# Patient Record
Sex: FEMALE | Race: WHITE | Employment: OTHER | RURAL
[De-identification: names, ages, dates, MRNs, and addresses within clinical notes are randomized per-mention and may not be internally consistent; named-entity substitution may affect disease eponyms.]

---

## 2017-03-15 ENCOUNTER — OFFICE VISIT (OUTPATIENT)
Dept: FAMILY MEDICINE CLINIC | Age: 62
End: 2017-03-15
Payer: MEDICARE

## 2017-03-15 ENCOUNTER — TELEPHONE (OUTPATIENT)
Dept: FAMILY MEDICINE CLINIC | Age: 62
End: 2017-03-15

## 2017-03-15 VITALS
HEART RATE: 81 BPM | WEIGHT: 223 LBS | OXYGEN SATURATION: 96 % | HEIGHT: 65 IN | TEMPERATURE: 98.8 F | SYSTOLIC BLOOD PRESSURE: 138 MMHG | BODY MASS INDEX: 37.15 KG/M2 | DIASTOLIC BLOOD PRESSURE: 80 MMHG

## 2017-03-15 DIAGNOSIS — J01.40 ACUTE NON-RECURRENT PANSINUSITIS: ICD-10-CM

## 2017-03-15 DIAGNOSIS — J06.9 ACUTE URI: Primary | ICD-10-CM

## 2017-03-15 PROCEDURE — 3017F COLORECTAL CA SCREEN DOC REV: CPT | Performed by: NURSE PRACTITIONER

## 2017-03-15 PROCEDURE — G8427 DOCREV CUR MEDS BY ELIG CLIN: HCPCS | Performed by: NURSE PRACTITIONER

## 2017-03-15 PROCEDURE — G8484 FLU IMMUNIZE NO ADMIN: HCPCS | Performed by: NURSE PRACTITIONER

## 2017-03-15 PROCEDURE — 3014F SCREEN MAMMO DOC REV: CPT | Performed by: NURSE PRACTITIONER

## 2017-03-15 PROCEDURE — 96372 THER/PROPH/DIAG INJ SC/IM: CPT | Performed by: NURSE PRACTITIONER

## 2017-03-15 PROCEDURE — 99213 OFFICE O/P EST LOW 20 MIN: CPT | Performed by: NURSE PRACTITIONER

## 2017-03-15 PROCEDURE — G8419 CALC BMI OUT NRM PARAM NOF/U: HCPCS | Performed by: NURSE PRACTITIONER

## 2017-03-15 PROCEDURE — 1036F TOBACCO NON-USER: CPT | Performed by: NURSE PRACTITIONER

## 2017-03-15 RX ORDER — CEFPROZIL 500 MG/1
500 TABLET, FILM COATED ORAL 2 TIMES DAILY
Qty: 20 TABLET | Refills: 0 | Status: SHIPPED | OUTPATIENT
Start: 2017-03-15 | End: 2017-03-25

## 2017-03-15 RX ORDER — CEFTRIAXONE 1 G/1
1 INJECTION, POWDER, FOR SOLUTION INTRAMUSCULAR; INTRAVENOUS ONCE
Status: COMPLETED | OUTPATIENT
Start: 2017-03-15 | End: 2017-03-15

## 2017-03-15 RX ORDER — BENZONATATE 100 MG/1
200 CAPSULE ORAL 3 TIMES DAILY PRN
Qty: 30 CAPSULE | Refills: 1 | Status: SHIPPED | OUTPATIENT
Start: 2017-03-15 | End: 2017-03-22

## 2017-03-15 RX ORDER — CLONIDINE HYDROCHLORIDE 0.2 MG/1
0.2 TABLET ORAL DAILY
COMMUNITY
End: 2017-05-24 | Stop reason: SDUPTHER

## 2017-03-15 RX ORDER — LEVOFLOXACIN 500 MG/1
500 TABLET, FILM COATED ORAL DAILY
Qty: 10 TABLET | Refills: 0 | Status: SHIPPED | OUTPATIENT
Start: 2017-03-15 | End: 2017-03-25

## 2017-03-15 RX ORDER — TRIAMCINOLONE ACETONIDE 40 MG/ML
80 INJECTION, SUSPENSION INTRA-ARTICULAR; INTRAMUSCULAR ONCE
Status: COMPLETED | OUTPATIENT
Start: 2017-03-15 | End: 2017-03-15

## 2017-03-15 RX ADMIN — CEFTRIAXONE 1 G: 1 INJECTION, POWDER, FOR SOLUTION INTRAMUSCULAR; INTRAVENOUS at 11:34

## 2017-03-15 RX ADMIN — TRIAMCINOLONE ACETONIDE 80 MG: 40 INJECTION, SUSPENSION INTRA-ARTICULAR; INTRAMUSCULAR at 11:33

## 2017-05-24 ENCOUNTER — OFFICE VISIT (OUTPATIENT)
Dept: FAMILY MEDICINE CLINIC | Age: 62
End: 2017-05-24
Payer: MEDICARE

## 2017-05-24 VITALS
HEIGHT: 65 IN | WEIGHT: 219 LBS | DIASTOLIC BLOOD PRESSURE: 72 MMHG | SYSTOLIC BLOOD PRESSURE: 138 MMHG | BODY MASS INDEX: 36.49 KG/M2

## 2017-05-24 DIAGNOSIS — E78.00 ELEVATED CHOLESTEROL: ICD-10-CM

## 2017-05-24 DIAGNOSIS — E55.9 VITAMIN D DEFICIENCY: ICD-10-CM

## 2017-05-24 DIAGNOSIS — M15.9 GENERALIZED OSTEOARTHROSIS, INVOLVING MULTIPLE SITES: Primary | ICD-10-CM

## 2017-05-24 DIAGNOSIS — I10 ESSENTIAL HYPERTENSION: ICD-10-CM

## 2017-05-24 LAB
ALBUMIN SERPL-MCNC: 4.4 G/DL (ref 3.5–5.2)
ALP BLD-CCNC: 117 U/L (ref 35–104)
ALT SERPL-CCNC: 5 U/L (ref 5–33)
ANION GAP SERPL CALCULATED.3IONS-SCNC: 16 MMOL/L (ref 7–19)
AST SERPL-CCNC: 12 U/L (ref 5–32)
BILIRUB SERPL-MCNC: 0.4 MG/DL (ref 0.2–1.2)
BUN BLDV-MCNC: 12 MG/DL (ref 8–23)
CALCIUM SERPL-MCNC: 9.6 MG/DL (ref 8.8–10.2)
CHLORIDE BLD-SCNC: 104 MMOL/L (ref 98–111)
CHOLESTEROL, TOTAL: 206 MG/DL (ref 160–199)
CO2: 22 MMOL/L (ref 22–29)
CREAT SERPL-MCNC: 0.7 MG/DL (ref 0.5–0.9)
GFR NON-AFRICAN AMERICAN: >60
GLUCOSE BLD-MCNC: 88 MG/DL (ref 74–109)
HCT VFR BLD CALC: 44.3 % (ref 37–47)
HDLC SERPL-MCNC: 53 MG/DL (ref 65–121)
HEMOGLOBIN: 14.2 G/DL (ref 12–16)
LDL CHOLESTEROL CALCULATED: 138 MG/DL
MCH RBC QN AUTO: 30.9 PG (ref 27–31)
MCHC RBC AUTO-ENTMCNC: 32.1 G/DL (ref 33–37)
MCV RBC AUTO: 96.3 FL (ref 81–99)
PDW BLD-RTO: 14.7 % (ref 11.5–14.5)
PLATELET # BLD: 302 K/UL (ref 130–400)
PMV BLD AUTO: 11.3 FL (ref 7.4–10.4)
POTASSIUM SERPL-SCNC: 4.4 MMOL/L (ref 3.5–5)
RBC # BLD: 4.6 M/UL (ref 4.2–5.4)
SODIUM BLD-SCNC: 142 MMOL/L (ref 136–145)
TOTAL PROTEIN: 6.6 G/DL (ref 6.6–8.7)
TRIGL SERPL-MCNC: 75 MG/DL (ref 150–199)
VITAMIN D 25-HYDROXY: 18.7 NG/ML
WBC # BLD: 10.1 K/UL (ref 4.8–10.8)

## 2017-05-24 PROCEDURE — G8427 DOCREV CUR MEDS BY ELIG CLIN: HCPCS | Performed by: NURSE PRACTITIONER

## 2017-05-24 PROCEDURE — G8419 CALC BMI OUT NRM PARAM NOF/U: HCPCS | Performed by: NURSE PRACTITIONER

## 2017-05-24 PROCEDURE — 1036F TOBACCO NON-USER: CPT | Performed by: NURSE PRACTITIONER

## 2017-05-24 PROCEDURE — 36415 COLL VENOUS BLD VENIPUNCTURE: CPT | Performed by: NURSE PRACTITIONER

## 2017-05-24 PROCEDURE — 99214 OFFICE O/P EST MOD 30 MIN: CPT | Performed by: NURSE PRACTITIONER

## 2017-05-24 PROCEDURE — 3017F COLORECTAL CA SCREEN DOC REV: CPT | Performed by: NURSE PRACTITIONER

## 2017-05-24 PROCEDURE — 3014F SCREEN MAMMO DOC REV: CPT | Performed by: NURSE PRACTITIONER

## 2017-05-24 RX ORDER — DILTIAZEM HYDROCHLORIDE 240 MG/1
240 CAPSULE, COATED, EXTENDED RELEASE ORAL DAILY
Qty: 30 CAPSULE | Refills: 5 | Status: ON HOLD | OUTPATIENT
Start: 2017-05-24 | End: 2020-09-28 | Stop reason: HOSPADM

## 2017-05-24 RX ORDER — CLONIDINE HYDROCHLORIDE 0.2 MG/1
0.2 TABLET ORAL DAILY
Qty: 60 TABLET | Refills: 5 | Status: ON HOLD | OUTPATIENT
Start: 2017-05-24 | End: 2020-09-28 | Stop reason: HOSPADM

## 2017-05-24 RX ORDER — OMEPRAZOLE 40 MG/1
CAPSULE, DELAYED RELEASE ORAL
Qty: 30 CAPSULE | Refills: 5 | Status: SHIPPED | OUTPATIENT
Start: 2017-05-24

## 2017-05-24 RX ORDER — LOSARTAN POTASSIUM 100 MG/1
100 TABLET ORAL DAILY
Qty: 30 TABLET | Refills: 5 | Status: SHIPPED | OUTPATIENT
Start: 2017-05-24 | End: 2020-09-25

## 2017-05-24 RX ORDER — FUROSEMIDE 20 MG/1
20 TABLET ORAL DAILY
Qty: 30 TABLET | Refills: 5 | Status: SHIPPED | OUTPATIENT
Start: 2017-05-24 | End: 2021-04-20

## 2017-05-24 ASSESSMENT — ENCOUNTER SYMPTOMS
EYES NEGATIVE: 1
RESPIRATORY NEGATIVE: 1
SPUTUM PRODUCTION: 0
ORTHOPNEA: 0
SHORTNESS OF BREATH: 0
COUGH: 0
BACK PAIN: 0
HEARTBURN: 1
HEMOPTYSIS: 0

## 2020-09-25 ENCOUNTER — APPOINTMENT (OUTPATIENT)
Dept: GENERAL RADIOLOGY | Age: 65
DRG: 247 | End: 2020-09-25
Payer: MEDICARE

## 2020-09-25 ENCOUNTER — HOSPITAL ENCOUNTER (INPATIENT)
Age: 65
LOS: 1 days | Discharge: HOME | DRG: 247 | End: 2020-09-28
Attending: EMERGENCY MEDICINE | Admitting: HOSPITALIST
Payer: MEDICARE

## 2020-09-25 ENCOUNTER — APPOINTMENT (OUTPATIENT)
Dept: CT IMAGING | Age: 65
DRG: 247 | End: 2020-09-25
Payer: MEDICARE

## 2020-09-25 PROBLEM — I25.9 CHEST PAIN DUE TO MYOCARDIAL ISCHEMIA: Status: ACTIVE | Noted: 2020-09-25

## 2020-09-25 LAB
ALBUMIN SERPL-MCNC: 4.1 G/DL (ref 3.5–5.2)
ALP BLD-CCNC: 89 U/L (ref 35–104)
ALT SERPL-CCNC: <5 U/L (ref 5–33)
ANION GAP SERPL CALCULATED.3IONS-SCNC: 11 MMOL/L (ref 7–19)
APTT: 32 SEC (ref 26–36.2)
AST SERPL-CCNC: 16 U/L (ref 5–32)
BASOPHILS ABSOLUTE: 0.1 K/UL (ref 0–0.2)
BASOPHILS RELATIVE PERCENT: 0.5 % (ref 0–1)
BILIRUB SERPL-MCNC: <0.2 MG/DL (ref 0.2–1.2)
BILIRUBIN URINE: NEGATIVE
BLOOD, URINE: NEGATIVE
BUN BLDV-MCNC: 11 MG/DL (ref 8–23)
CALCIUM SERPL-MCNC: 9.2 MG/DL (ref 8.8–10.2)
CHLORIDE BLD-SCNC: 105 MMOL/L (ref 98–111)
CHOLESTEROL, TOTAL: 188 MG/DL (ref 160–199)
CLARITY: CLEAR
CO2: 25 MMOL/L (ref 22–29)
COLOR: YELLOW
CREAT SERPL-MCNC: 0.9 MG/DL (ref 0.5–0.9)
D DIMER: 0.35 UG/ML FEU (ref 0–0.48)
EOSINOPHILS ABSOLUTE: 0.1 K/UL (ref 0–0.6)
EOSINOPHILS RELATIVE PERCENT: 0.8 % (ref 0–5)
GFR AFRICAN AMERICAN: >59
GFR NON-AFRICAN AMERICAN: >60
GLUCOSE BLD-MCNC: 118 MG/DL (ref 74–109)
GLUCOSE URINE: NEGATIVE MG/DL
HBA1C MFR BLD: 5.1 % (ref 4–6)
HCT VFR BLD CALC: 44.7 % (ref 37–47)
HDLC SERPL-MCNC: 42 MG/DL (ref 65–121)
HEMOGLOBIN: 14.9 G/DL (ref 12–16)
IMMATURE GRANULOCYTES #: 0 K/UL
INR BLD: 1.13 (ref 0.88–1.18)
KETONES, URINE: NEGATIVE MG/DL
LDL CHOLESTEROL CALCULATED: 106 MG/DL
LEUKOCYTE ESTERASE, URINE: NEGATIVE
LYMPHOCYTES ABSOLUTE: 3.2 K/UL (ref 1.1–4.5)
LYMPHOCYTES RELATIVE PERCENT: 31.7 % (ref 20–40)
MAGNESIUM: 2.1 MG/DL (ref 1.6–2.4)
MCH RBC QN AUTO: 31.1 PG (ref 27–31)
MCHC RBC AUTO-ENTMCNC: 33.3 G/DL (ref 33–37)
MCV RBC AUTO: 93.3 FL (ref 81–99)
MONOCYTES ABSOLUTE: 0.9 K/UL (ref 0–0.9)
MONOCYTES RELATIVE PERCENT: 8.7 % (ref 0–10)
NEUTROPHILS ABSOLUTE: 5.9 K/UL (ref 1.5–7.5)
NEUTROPHILS RELATIVE PERCENT: 58 % (ref 50–65)
NITRITE, URINE: NEGATIVE
PDW BLD-RTO: 14.6 % (ref 11.5–14.5)
PH UA: 5.5 (ref 5–8)
PHOSPHORUS: 3.7 MG/DL (ref 2.5–4.5)
PLATELET # BLD: 293 K/UL (ref 130–400)
PMV BLD AUTO: 10.6 FL (ref 9.4–12.3)
POTASSIUM REFLEX MAGNESIUM: 3.6 MMOL/L (ref 3.5–5)
PRO-BNP: 1509 PG/ML (ref 0–900)
PROTEIN UA: NEGATIVE MG/DL
PROTHROMBIN TIME: 14.5 SEC (ref 12–14.6)
RBC # BLD: 4.79 M/UL (ref 4.2–5.4)
SODIUM BLD-SCNC: 141 MMOL/L (ref 136–145)
SPECIFIC GRAVITY UA: 1.01 (ref 1–1.03)
T4 FREE: 1.56 NG/DL (ref 0.93–1.7)
TOTAL PROTEIN: 6.4 G/DL (ref 6.6–8.7)
TRIGL SERPL-MCNC: 199 MG/DL (ref 0–149)
TROPONIN: <0.01 NG/ML (ref 0–0.03)
TSH REFLEX FT4: 0.78 UIU/ML (ref 0.35–5.5)
UROBILINOGEN, URINE: 1 E.U./DL
WBC # BLD: 10.2 K/UL (ref 4.8–10.8)

## 2020-09-25 PROCEDURE — 36415 COLL VENOUS BLD VENIPUNCTURE: CPT

## 2020-09-25 PROCEDURE — 84439 ASSAY OF FREE THYROXINE: CPT

## 2020-09-25 PROCEDURE — 83735 ASSAY OF MAGNESIUM: CPT

## 2020-09-25 PROCEDURE — 83036 HEMOGLOBIN GLYCOSYLATED A1C: CPT

## 2020-09-25 PROCEDURE — 84100 ASSAY OF PHOSPHORUS: CPT

## 2020-09-25 PROCEDURE — 85025 COMPLETE CBC W/AUTO DIFF WBC: CPT

## 2020-09-25 PROCEDURE — 84443 ASSAY THYROID STIM HORMONE: CPT

## 2020-09-25 PROCEDURE — 99284 EMERGENCY DEPT VISIT MOD MDM: CPT

## 2020-09-25 PROCEDURE — 85610 PROTHROMBIN TIME: CPT

## 2020-09-25 PROCEDURE — G0378 HOSPITAL OBSERVATION PER HR: HCPCS

## 2020-09-25 PROCEDURE — 81003 URINALYSIS AUTO W/O SCOPE: CPT

## 2020-09-25 PROCEDURE — 85379 FIBRIN DEGRADATION QUANT: CPT

## 2020-09-25 PROCEDURE — 6370000000 HC RX 637 (ALT 250 FOR IP): Performed by: HOSPITALIST

## 2020-09-25 PROCEDURE — 80061 LIPID PANEL: CPT

## 2020-09-25 PROCEDURE — 71275 CT ANGIOGRAPHY CHEST: CPT

## 2020-09-25 PROCEDURE — 99283 EMERGENCY DEPT VISIT LOW MDM: CPT

## 2020-09-25 PROCEDURE — 2580000003 HC RX 258: Performed by: HOSPITALIST

## 2020-09-25 PROCEDURE — 83880 ASSAY OF NATRIURETIC PEPTIDE: CPT

## 2020-09-25 PROCEDURE — 6360000004 HC RX CONTRAST MEDICATION: Performed by: EMERGENCY MEDICINE

## 2020-09-25 PROCEDURE — 84484 ASSAY OF TROPONIN QUANT: CPT

## 2020-09-25 PROCEDURE — 80053 COMPREHEN METABOLIC PANEL: CPT

## 2020-09-25 PROCEDURE — 71045 X-RAY EXAM CHEST 1 VIEW: CPT

## 2020-09-25 PROCEDURE — 99999 PR OFFICE/OUTPT VISIT,PROCEDURE ONLY: CPT | Performed by: EMERGENCY MEDICINE

## 2020-09-25 PROCEDURE — 85730 THROMBOPLASTIN TIME PARTIAL: CPT

## 2020-09-25 RX ORDER — ACETAMINOPHEN 325 MG/1
650 TABLET ORAL EVERY 6 HOURS PRN
Status: DISCONTINUED | OUTPATIENT
Start: 2020-09-25 | End: 2020-09-27 | Stop reason: SDUPTHER

## 2020-09-25 RX ORDER — OLMESARTAN MEDOXOMIL 20 MG/1
20 TABLET ORAL DAILY
Status: ON HOLD | COMMUNITY
End: 2020-09-28 | Stop reason: HOSPADM

## 2020-09-25 RX ORDER — ALBUTEROL SULFATE 2.5 MG/3ML
2.5 SOLUTION RESPIRATORY (INHALATION) EVERY 4 HOURS PRN
Status: DISCONTINUED | OUTPATIENT
Start: 2020-09-25 | End: 2020-09-28 | Stop reason: HOSPADM

## 2020-09-25 RX ORDER — SODIUM CHLORIDE 0.9 % (FLUSH) 0.9 %
10 SYRINGE (ML) INJECTION PRN
Status: DISCONTINUED | OUTPATIENT
Start: 2020-09-25 | End: 2020-09-27 | Stop reason: SDUPTHER

## 2020-09-25 RX ORDER — PANTOPRAZOLE SODIUM 40 MG/1
40 TABLET, DELAYED RELEASE ORAL
Status: DISCONTINUED | OUTPATIENT
Start: 2020-09-26 | End: 2020-09-28 | Stop reason: HOSPADM

## 2020-09-25 RX ORDER — ASPIRIN 81 MG/1
81 TABLET, CHEWABLE ORAL DAILY
Status: DISCONTINUED | OUTPATIENT
Start: 2020-09-26 | End: 2020-09-28 | Stop reason: HOSPADM

## 2020-09-25 RX ORDER — ATORVASTATIN CALCIUM 40 MG/1
40 TABLET, FILM COATED ORAL NIGHTLY
Status: DISCONTINUED | OUTPATIENT
Start: 2020-09-25 | End: 2020-09-28 | Stop reason: HOSPADM

## 2020-09-25 RX ORDER — MAGNESIUM SULFATE IN WATER 40 MG/ML
2 INJECTION, SOLUTION INTRAVENOUS PRN
Status: DISCONTINUED | OUTPATIENT
Start: 2020-09-25 | End: 2020-09-28 | Stop reason: HOSPADM

## 2020-09-25 RX ORDER — CLONIDINE HYDROCHLORIDE 0.1 MG/1
0.2 TABLET ORAL NIGHTLY
Status: DISCONTINUED | OUTPATIENT
Start: 2020-09-25 | End: 2020-09-26

## 2020-09-25 RX ORDER — POTASSIUM CHLORIDE 7.45 MG/ML
10 INJECTION INTRAVENOUS PRN
Status: DISCONTINUED | OUTPATIENT
Start: 2020-09-25 | End: 2020-09-28 | Stop reason: HOSPADM

## 2020-09-25 RX ORDER — ACETAMINOPHEN 650 MG/1
650 SUPPOSITORY RECTAL EVERY 6 HOURS PRN
Status: DISCONTINUED | OUTPATIENT
Start: 2020-09-25 | End: 2020-09-28 | Stop reason: HOSPADM

## 2020-09-25 RX ORDER — LANOLIN ALCOHOL/MO/W.PET/CERES
3 CREAM (GRAM) TOPICAL NIGHTLY PRN
Status: DISCONTINUED | OUTPATIENT
Start: 2020-09-25 | End: 2020-09-28 | Stop reason: HOSPADM

## 2020-09-25 RX ORDER — ONDANSETRON 4 MG/1
4 TABLET, ORALLY DISINTEGRATING ORAL EVERY 8 HOURS PRN
Status: DISCONTINUED | OUTPATIENT
Start: 2020-09-25 | End: 2020-09-28 | Stop reason: HOSPADM

## 2020-09-25 RX ORDER — ONDANSETRON 2 MG/ML
4 INJECTION INTRAMUSCULAR; INTRAVENOUS EVERY 6 HOURS PRN
Status: DISCONTINUED | OUTPATIENT
Start: 2020-09-25 | End: 2020-09-27 | Stop reason: SDUPTHER

## 2020-09-25 RX ORDER — ACETAMINOPHEN 500 MG
1000 TABLET ORAL EVERY 6 HOURS PRN
Status: ON HOLD | COMMUNITY
End: 2020-09-28 | Stop reason: HOSPADM

## 2020-09-25 RX ORDER — POLYETHYLENE GLYCOL 3350 17 G/17G
17 POWDER, FOR SOLUTION ORAL DAILY PRN
Status: DISCONTINUED | OUTPATIENT
Start: 2020-09-25 | End: 2020-09-28 | Stop reason: HOSPADM

## 2020-09-25 RX ORDER — POTASSIUM CHLORIDE 20 MEQ/1
40 TABLET, EXTENDED RELEASE ORAL PRN
Status: DISCONTINUED | OUTPATIENT
Start: 2020-09-25 | End: 2020-09-28 | Stop reason: HOSPADM

## 2020-09-25 RX ORDER — SODIUM CHLORIDE 0.9 % (FLUSH) 0.9 %
10 SYRINGE (ML) INJECTION EVERY 12 HOURS SCHEDULED
Status: DISCONTINUED | OUTPATIENT
Start: 2020-09-25 | End: 2020-09-27 | Stop reason: SDUPTHER

## 2020-09-25 RX ORDER — FUROSEMIDE 40 MG/1
20 TABLET ORAL DAILY
Status: DISCONTINUED | OUTPATIENT
Start: 2020-09-25 | End: 2020-09-28 | Stop reason: HOSPADM

## 2020-09-25 RX ORDER — HYDROCORTISONE ACETATE 25 MG/1
25 SUPPOSITORY RECTAL 2 TIMES DAILY PRN
Status: DISCONTINUED | OUTPATIENT
Start: 2020-09-25 | End: 2020-09-28 | Stop reason: HOSPADM

## 2020-09-25 RX ORDER — DILTIAZEM HYDROCHLORIDE 240 MG/1
240 CAPSULE, COATED, EXTENDED RELEASE ORAL DAILY
Status: DISCONTINUED | OUTPATIENT
Start: 2020-09-26 | End: 2020-09-26

## 2020-09-25 RX ADMIN — IOPAMIDOL 90 ML: 755 INJECTION, SOLUTION INTRAVENOUS at 20:01

## 2020-09-25 RX ADMIN — NITROGLYCERIN 1 INCH: 20 OINTMENT TOPICAL at 23:00

## 2020-09-25 RX ADMIN — CLONIDINE HYDROCHLORIDE 0.2 MG: 0.1 TABLET ORAL at 22:54

## 2020-09-25 RX ADMIN — MELATONIN 3 MG: at 22:54

## 2020-09-25 RX ADMIN — SODIUM CHLORIDE, PRESERVATIVE FREE 10 ML: 5 INJECTION INTRAVENOUS at 22:55

## 2020-09-25 ASSESSMENT — ENCOUNTER SYMPTOMS
VOICE CHANGE: 0
VOMITING: 0
ABDOMINAL PAIN: 0
COUGH: 0
EYE PAIN: 0
SHORTNESS OF BREATH: 1
DIARRHEA: 0
RHINORRHEA: 0
EYE REDNESS: 0

## 2020-09-25 ASSESSMENT — PAIN SCALES - GENERAL
PAINLEVEL_OUTOF10: 0
PAINLEVEL_OUTOF10: 0

## 2020-09-25 NOTE — ED NOTES
ASSESSMENT:  Pt co increasing SOB over the past month, worse with exertion. SKIN:  Warm, dry, pink. Cap refill < 2 sec  CARDIAC:  S1 S2 noted  LUNGS: clear upper and lower lobes. Respirations even and unlabored. ABDOMEN: bowel sounds noted upper and lower quadrants. Soft and tender. EXTREMITIES: bilateral DP and PT. No edema noted. Pt alert and oriented x4. Pupils equal and reactive. No distress noted. Side rails up and call light within reach.        Nikki Foster RN  09/25/20 6250

## 2020-09-25 NOTE — ED NOTES
Pt aware urine sample is needed, but unable to void at this time.       Pretty Lora RN  09/25/20 [id number]

## 2020-09-25 NOTE — ED NOTES
Patient placed on cardiac monitor, continuous pulse oximeter, and NIBP monitor. Monitor alarms on. Patient placed in a gown  Mask and gloves worn by staff while in pt room. Pt masked during all contact with staff.        Dixie Villar RN  09/25/20 [id number]

## 2020-09-26 ENCOUNTER — APPOINTMENT (OUTPATIENT)
Dept: ULTRASOUND IMAGING | Age: 65
DRG: 247 | End: 2020-09-26
Payer: MEDICARE

## 2020-09-26 PROBLEM — E66.9 OBESITY (BMI 30-39.9): Status: ACTIVE | Noted: 2020-09-26

## 2020-09-26 PROBLEM — E27.8 ADRENAL NODULE (HCC): Status: ACTIVE | Noted: 2020-09-26

## 2020-09-26 PROBLEM — E27.9 ADRENAL NODULE (HCC): Status: ACTIVE | Noted: 2020-09-26

## 2020-09-26 LAB
ANION GAP SERPL CALCULATED.3IONS-SCNC: 15 MMOL/L (ref 7–19)
BUN BLDV-MCNC: 13 MG/DL (ref 8–23)
CALCIUM SERPL-MCNC: 8.8 MG/DL (ref 8.8–10.2)
CHLORIDE BLD-SCNC: 106 MMOL/L (ref 98–111)
CO2: 22 MMOL/L (ref 22–29)
CREAT SERPL-MCNC: 0.7 MG/DL (ref 0.5–0.9)
GFR AFRICAN AMERICAN: >59
GFR NON-AFRICAN AMERICAN: >60
GLUCOSE BLD-MCNC: 120 MG/DL (ref 74–109)
HCT VFR BLD CALC: 39 % (ref 37–47)
HEMOGLOBIN: 12.8 G/DL (ref 12–16)
LV EF: 52 %
LVEF MODALITY: NORMAL
MCH RBC QN AUTO: 31.3 PG (ref 27–31)
MCHC RBC AUTO-ENTMCNC: 32.8 G/DL (ref 33–37)
MCV RBC AUTO: 95.4 FL (ref 81–99)
PDW BLD-RTO: 14.5 % (ref 11.5–14.5)
PLATELET # BLD: 265 K/UL (ref 130–400)
PMV BLD AUTO: 11.1 FL (ref 9.4–12.3)
POTASSIUM REFLEX MAGNESIUM: 3.6 MMOL/L (ref 3.5–5)
RBC # BLD: 4.09 M/UL (ref 4.2–5.4)
SODIUM BLD-SCNC: 143 MMOL/L (ref 136–145)
TROPONIN: <0.01 NG/ML (ref 0–0.03)
WBC # BLD: 11.5 K/UL (ref 4.8–10.8)

## 2020-09-26 PROCEDURE — 6370000000 HC RX 637 (ALT 250 FOR IP): Performed by: INTERNAL MEDICINE

## 2020-09-26 PROCEDURE — G0378 HOSPITAL OBSERVATION PER HR: HCPCS

## 2020-09-26 PROCEDURE — 93306 TTE W/DOPPLER COMPLETE: CPT

## 2020-09-26 PROCEDURE — 93005 ELECTROCARDIOGRAM TRACING: CPT | Performed by: HOSPITALIST

## 2020-09-26 PROCEDURE — 6370000000 HC RX 637 (ALT 250 FOR IP): Performed by: HOSPITALIST

## 2020-09-26 PROCEDURE — 84484 ASSAY OF TROPONIN QUANT: CPT

## 2020-09-26 PROCEDURE — 96372 THER/PROPH/DIAG INJ SC/IM: CPT

## 2020-09-26 PROCEDURE — 85027 COMPLETE CBC AUTOMATED: CPT

## 2020-09-26 PROCEDURE — 6360000002 HC RX W HCPCS: Performed by: HOSPITALIST

## 2020-09-26 PROCEDURE — 80048 BASIC METABOLIC PNL TOTAL CA: CPT

## 2020-09-26 PROCEDURE — 99222 1ST HOSP IP/OBS MODERATE 55: CPT | Performed by: INTERNAL MEDICINE

## 2020-09-26 PROCEDURE — 36415 COLL VENOUS BLD VENIPUNCTURE: CPT

## 2020-09-26 RX ORDER — AMLODIPINE BESYLATE 5 MG/1
5 TABLET ORAL 2 TIMES DAILY
Status: DISCONTINUED | OUTPATIENT
Start: 2020-09-26 | End: 2020-09-28 | Stop reason: HOSPADM

## 2020-09-26 RX ORDER — CARVEDILOL 12.5 MG/1
12.5 TABLET ORAL 2 TIMES DAILY WITH MEALS
Status: DISCONTINUED | OUTPATIENT
Start: 2020-09-26 | End: 2020-09-28 | Stop reason: HOSPADM

## 2020-09-26 RX ORDER — ISOSORBIDE MONONITRATE 30 MG/1
30 TABLET, EXTENDED RELEASE ORAL DAILY
Status: DISCONTINUED | OUTPATIENT
Start: 2020-09-26 | End: 2020-09-28 | Stop reason: HOSPADM

## 2020-09-26 RX ORDER — LOSARTAN POTASSIUM 100 MG/1
100 TABLET ORAL DAILY
Status: DISCONTINUED | OUTPATIENT
Start: 2020-09-26 | End: 2020-09-28 | Stop reason: HOSPADM

## 2020-09-26 RX ADMIN — DILTIAZEM HYDROCHLORIDE 240 MG: 240 CAPSULE, COATED, EXTENDED RELEASE ORAL at 08:17

## 2020-09-26 RX ADMIN — NITROGLYCERIN 1 INCH: 20 OINTMENT TOPICAL at 19:57

## 2020-09-26 RX ADMIN — LOSARTAN POTASSIUM 100 MG: 100 TABLET, FILM COATED ORAL at 10:56

## 2020-09-26 RX ADMIN — MELATONIN 3 MG: at 20:01

## 2020-09-26 RX ADMIN — AMLODIPINE BESYLATE 5 MG: 5 TABLET ORAL at 15:44

## 2020-09-26 RX ADMIN — NITROGLYCERIN 1 INCH: 20 OINTMENT TOPICAL at 05:25

## 2020-09-26 RX ADMIN — PANTOPRAZOLE SODIUM 40 MG: 40 TABLET, DELAYED RELEASE ORAL at 05:26

## 2020-09-26 RX ADMIN — ISOSORBIDE MONONITRATE 30 MG: 30 TABLET, EXTENDED RELEASE ORAL at 15:44

## 2020-09-26 RX ADMIN — ENOXAPARIN SODIUM 40 MG: 40 INJECTION SUBCUTANEOUS at 10:56

## 2020-09-26 RX ADMIN — CARVEDILOL 12.5 MG: 12.5 TABLET, FILM COATED ORAL at 17:48

## 2020-09-26 RX ADMIN — AMLODIPINE BESYLATE 5 MG: 5 TABLET ORAL at 19:57

## 2020-09-26 RX ADMIN — NITROGLYCERIN 1 INCH: 20 OINTMENT TOPICAL at 13:30

## 2020-09-26 RX ADMIN — ASPIRIN 81 MG: 81 TABLET, CHEWABLE ORAL at 08:16

## 2020-09-26 RX ADMIN — FUROSEMIDE 20 MG: 40 TABLET ORAL at 08:16

## 2020-09-26 ASSESSMENT — ENCOUNTER SYMPTOMS
EYE DISCHARGE: 0
DIARRHEA: 0
ABDOMINAL DISTENTION: 0
CONSTIPATION: 0
BACK PAIN: 0
CHEST TIGHTNESS: 1
WHEEZING: 0
SHORTNESS OF BREATH: 1
VOMITING: 0
BLOOD IN STOOL: 0
COUGH: 0

## 2020-09-26 ASSESSMENT — PAIN SCALES - GENERAL
PAINLEVEL_OUTOF10: 0

## 2020-09-26 NOTE — H&P
test.    (following subjective sections as per chart review, other than allergies)    Past Medical History:   Diagnosis Date    DJD (degenerative joint disease)     knees    Hypertension      Past Surgical History:   Procedure Laterality Date    CARPAL TUNNEL RELEASE      right arm     SECTION      CORONARY ANGIOPLASTY   and     HAND TENDON SURGERY      right    HYSTERECTOMY       Social History     Tobacco History     Smoking Status  Smoker Quit date   Smoking Frequency  1 pack/day Smoking Tobacco Type  Cigarettes    Smokeless Tobacco Use  Never Used          Alcohol History     Alcohol Use Status  No          Drug Use     Drug Use Status  No           Family History   Problem Relation Age of Onset    Heart Failure Mother          heart attack    Stroke Mother     Hypertension Mother     Cancer Other         Uncles    Hypertension Other         paternal side as well     Allergies   Allergen Reactions    Ace Inhibitors      \"I really don't remember\" - [id number]    Augmentin [Amoxicillin-Pot Clavulanate] Nausea Only     Current Facility-Administered Medications   Medication Dose Route Frequency Provider Last Rate Last Dose    albuterol (PROVENTIL) nebulizer solution 2.5 mg  2.5 mg Nebulization Q4H PRN Edwige Yost MD        hydrocortisone (ANUSOL-HC) suppository 25 mg  25 mg Rectal BID PRN Edwige Yost MD        furosemide (LASIX) tablet 20 mg  20 mg Oral Daily Edwige Yost MD        pantoprazole (PROTONIX) tablet 40 mg  40 mg Oral QAM AC Edwige Yost MD   40 mg at 20 0526    dilTIAZem (CARDIZEM CD) extended release capsule 240 mg  240 mg Oral Daily Edwige Yost MD        cloNIDine (CATAPRES) tablet 0.2 mg  0.2 mg Oral Nightly Edwige Yost MD   0.2 mg at 20 225    sodium chloride flush 0.9 % injection 10 mL  10 mL Intravenous 2 times per day Edwige Yost MD   10 mL at 20 2255    sodium chloride flush 0.9 % injection 10 mL  10 mL Intravenous PRN Michelet Hickey MD        acetaminophen (TYLENOL) tablet 650 mg  650 mg Oral Q6H PRN Michelet Hickey MD        Or    acetaminophen (TYLENOL) suppository 650 mg  650 mg Rectal Q6H PRN Michelet Hickey MD        atorvastatin (LIPITOR) tablet 40 mg  40 mg Oral Nightly Michelet Hickey MD        aspirin chewable tablet 81 mg  81 mg Oral Daily Michelet Hickey MD        perflutren lipid microspheres (DEFINITY) injection 1.65 mg  1.5 mL Intravenous ONCE PRN Michelet Hickey MD        potassium chloride (KLOR-CON M) extended release tablet 40 mEq  40 mEq Oral PRN Michelet Hickey MD        Or    potassium bicarb-citric acid (EFFER-K) effervescent tablet 40 mEq  40 mEq Oral PRN Michelet Hickey MD        Or    potassium chloride 10 mEq/100 mL IVPB (Peripheral Line)  10 mEq Intravenous PRN Michelet Hickey MD        magnesium sulfate 2 g in 50 mL IVPB premix  2 g Intravenous PRN Michelet Hickey MD        ondansetron (ZOFRAN-ODT) disintegrating tablet 4 mg  4 mg Oral Q8H PRN Michelet Hickey MD        Or    [facility]etron [facility]) injection 4 mg  4 mg Intravenous Q6H PRN Michelet Hickey MD        polyethylene glycol [facility]) packet 17 g  17 g Oral Daily PRN Michelet Hickey MD        melatonin tablet 3 mg  3 mg Oral Nightly PRN Michelet Hickey MD   3 mg at 09/25/20 2254    enoxaparin (LOVENOX) injection 40 mg  40 mg Subcutaneous Daily Michelet Hickey MD        nitroglycerin (NITRO-BID) 2 % ointment 1 inch  1 inch Topical 4 times per day Michelet Hickey MD   1 inch at 09/26/20 0525     Home Medications:  Prior to Admission medications    Medication Sig Start Date End Date Taking?  Authorizing Provider   olmesartan (BENICAR) 20 MG tablet Take 20 mg by mouth daily Pt is unsure of dose   Yes Historical Provider, MD   acetaminophen (TYLENOL) 500 MG tablet Take 1,000 mg by mouth every 6 hours as needed for Pain   Yes Historical Provider, MD   diltiazem (CARTIA XT) 240 MG extended release capsule Take 1 capsule by mouth daily 5/24/17 9/25/20 Yes RAFAT Gonzalez CNP   furosemide (LASIX) 20 MG tablet Take 1 tablet by mouth daily 5/24/17  Yes RAFAT Gonzalez CNP   cloNIDine (CATAPRES) 0.2 MG tablet Take 1 tablet by mouth daily  Patient taking differently: Take 0.2 mg by mouth nightly  5/24/17  Yes RAFAT Gonzalez CNP   omeprazole (PRILOSEC) 40 MG delayed release capsule TAKE ONE CAPSULE BY MOUTH ONCE DAILY 5/24/17  Yes RAFAT Gonzalez CNP         OBJECTIVE:    Vitals:    09/26/20 0221   BP: (!) 168/92   Pulse: 89   Resp: 18   Temp: 97.4 °F (36.3 °C)   SpO2: 97%   breathing room air    BP (!) 168/92   Pulse 89   Temp 97.4 °F (36.3 °C) (Temporal)   Resp 18   Ht 5' 5\" (1.651 m)   Wt 192 lb 9.6 oz (87.4 kg)   SpO2 97%   BMI 32.05 kg/m²       Intake/Output Summary (Last 24 hours) at 9/26/2020 0733  Last data filed at 9/26/2020 0706  Gross per 24 hour   Intake 1500 ml   Output 1475 ml   Net 25 ml     Physical Exam  Vitals signs reviewed. Constitutional:       General: She is not in acute distress. Appearance: Normal appearance. She is obese. She is not ill-appearing or toxic-appearing. HENT:      Head: Normocephalic and atraumatic. Nose: No congestion or rhinorrhea. Eyes:      General:         Right eye: No discharge. Left eye: No discharge. Neck:      Musculoskeletal: Neck supple. Comments: Trachea appears midline  Cardiovascular:      Rate and Rhythm: Normal rate and regular rhythm. Heart sounds: No murmur. No friction rub. No gallop. Pulmonary:      Effort: Pulmonary effort is normal. No respiratory distress. Breath sounds: No stridor. No wheezing, rhonchi or rales. Chest:      Chest wall: No tenderness. Abdominal:      General: Bowel sounds are normal.      Tenderness: There is no abdominal tenderness. There is no guarding or rebound. Musculoskeletal:         General: No tenderness. Right lower leg: No edema.       Left lower leg: No edema.   Skin:     General: Skin is warm. Comments: nondiaphoretic   Neurological:      Mental Status: She is alert and oriented to person, place, and time. Psychiatric:         Mood and Affect: Mood normal.         Behavior: Behavior normal.       LABORATORY DATA:    CBC:   Recent Labs     09/25/20 1738 09/26/20  0101   WBC 10.2 11.5*   HGB 14.9 12.8   HCT 44.7 39.0    265     BMP:   Recent Labs     09/25/20 1738 09/26/20  0101    143   K 3.6 3.6    106   CO2 25 22   BUN 11 13   CREATININE 0.9 0.7   CALCIUM 9.2 8.8   PHOS 3.7  --      Hepatic Profile:   Recent Labs     09/25/20 1738   AST 16   ALT <5*   BILITOT <0.2   ALKPHOS 89     Coag Panel:   Recent Labs     09/25/20 1738   INR 1.13   PROTIME 14.5   APTT 32.0     Cardiac Enzymes:   Recent Labs     09/26/20  0101 09/26/20  0403 09/26/20  0635   TROPONINI <0.01 <0.01 <0.01     Pro-BNP:   Recent Labs     09/25/20 1738   PROBNP 1,509*     A1C:   Recent Labs     09/25/20 1738   LABA1C 5.1     TSH: Invalid input(s): LABTSH    ABG: No results for input(s): PHART, NII9QFN, PO2ART, SLI9WLL, BEART, HGBAE, N3SRFHGG, CARBOXHGBART in the last 72 hours. Urinalysis:   Lab Results   Component Value Date    NITRU Negative 09/25/2020    BLOODU Negative 09/25/2020    SPECGRAV 1.011 09/25/2020    GLUCOSEU Negative 09/25/2020       IMAGING:  Xr Chest Portable  Result Date: 9/25/2020  1. Probable right basilar atelectasis, otherwise no acute radiographic cardiopulmonary process. . Signed by Dr Jennyfer Bonner on 9/25/2020 6:03 PM  Cta Pulmonary W Contrast  Result Date: 9/25/2020  1. No pulmonary emboli. 2. Dense coronary artery calcifications. Mild cardiomegaly. Trace pericardial fluid or thickening. No thoracic aortic aneurysm or dissection. 3. Mild fluid distended esophagus may be due to gastroesophageal reflux.  Poor primary esophageal peristalsis or lower esophageal stenosis considered in the differential. This places patient at increased risk for aspiration. No aspiration pneumonia seen on the current exam. Basilar atelectasis. Emphysematous change. 4. 5.5 cm fatty-containing left adrenal nodule favorable for a myelolipoma.  Signed by Dr Keyon Frost on 9/25/2020 8:17 PM        ASESSMENTS & PLANS:    Chest Pain:  Tele  Admit to cardiac shah as OBSERVATION status patient  Cardio consult in AM  Trend TnI  Mag, Phos, TSH, Lipid Panel, HbA1c - will run as add ons to ED labs if able  CBC and BMP with Reflex for following AM  ASA as per protocol (324-325mg chewed STAT unless on 81ASA daily in which case 162mg chewed STAT if not yet given, THEN from following AM 81mg Daily.)  Statin as per protocol (High intensity Statin, if already on high intensity Stain of Simvasttain 80 continue it, if Lipitor 40+ then Lipitor 80 (if less than 40 just double so long as >=40), if Rosuvastatin 20mg+ then Rosuvastatin 40mg PO QHS (if less than 20 just double so long as >=20mg)  NG SL PRN CP  TTE in AM  Continue home cardiac meds  Need dose of Olmesartan to continue it  Added on D Dimer and FT4 and ProBNP      Chronic Medical Problems:  Continue current Regimen as indicated      Patient Active Problem List   Diagnosis    Essential hypertension    Generalized osteoarthrosis, involving multiple sites    Essential hypertension    Edema    Encounter for long-term (current) use of other medications    Bronchitis    Elevated cholesterol    Vitamin D deficiency    HTN (hypertension)    Osteoporosis    Chest pain due to myocardial ischemia      furosemide  20 mg Oral Daily    pantoprazole  40 mg Oral QAM AC    dilTIAZem  240 mg Oral Daily    cloNIDine  0.2 mg Oral Nightly    sodium chloride flush  10 mL Intravenous 2 times per day    atorvastatin  40 mg Oral Nightly    aspirin  81 mg Oral Daily    enoxaparin  40 mg Subcutaneous Daily    nitroglycerin  1 inch Topical 4 times per day       Supoportive and Prophylactic Txx:  DVT Prophylaxis: Lovenox SQ  GI (PUD) Ppx: not indicated as such  PT consult for evalutation and Txx as indicated: not indicated  DIET CARDIAC; No Caffeine  albuterol, hydrocortisone, sodium chloride flush, acetaminophen **OR** acetaminophen, perflutren lipid microspheres, potassium chloride **OR** potassium alternative oral replacement **OR** potassium chloride, magnesium sulfate, ondansetron **OR** ondansetron, polyethylene glycol, melatonin      Care time of >45 minutes  Pt seen/examined and admitted to Observation status. Inpatient status is used for patients with an expected LOS extending past two midnights due to medical therapy and or critical care needs, otherwise patients are placed to OBServation status. Signed:  Electronically signed by Eren Grigsby MD on 9/26/20 at 07:50 AM CDT.

## 2020-09-26 NOTE — ED NOTES
Pt's  phone number [phone], call him if you need him for anything     Galilea Mcclellan RN  09/25/20 7608

## 2020-09-26 NOTE — CONSULTS
[facility]  Cardiology Consult      Requesting MD:  Sundar Portillo MD   Admit Status:  Observation [104]       History obtained from:   ? Patient  ? Other (specify):     PROBLEM LIST:    Patient Active Problem List    Diagnosis Date Noted    Adrenal nodule ([address] 09/26/2020     Priority: Low    Obesity (BMI 30-39.9) 09/26/2020     Priority: Low    Chest pain due to myocardial ischemia 09/25/2020     Priority: Low    Osteoporosis 11/28/2016     Priority: Low    HTN (hypertension) 12/10/2013     Priority: Low    Vitamin D deficiency 06/20/2013     Priority: Low    Elevated cholesterol 12/21/2012     Priority: Low    Bronchitis 11/26/2012     Priority: Low    Essential hypertension 02/03/2011     Priority: Low    Edema 02/03/2011     Priority: Low    Encounter for long-term (current) use of other medications 02/03/2011     Priority: Low    Essential hypertension 01/03/2011     Priority: Low    Generalized osteoarthrosis, involving multiple sites 01/03/2011     Priority: Low     1. Coronary artery disease, prior catheterization 1/22/2014 with intermediate mid circumflex stenosis (FFR 0.84) medically managed, normal LV ejection fraction. 2.  Hypertension with urgency and evidence of large left adrenal mass 5.5 cm (questionable myolipoma). 3.  Active ongoing tobacco use. 4.  Unstable angina presentation with hypertensive urgency. PRESENTATION: Maya Davenport is a 63y.o. year old female who presents with episode of fluttering in her chest associate with chest tightness and palpitations which she describes as fast beating and noted to have elevated blood pressures at 622 systolic which prompted evaluation. Worse symptom lasted about 30 to 40 minutes. Troponins were negative. Around Labor Day while lying in bed sleeping she experienced left upper back pain which lasted about 2 hours.   This was associate with palpitations and shortness of breath as well as chest pressure when she tried to get up and walk around. She did not seek medical attention at that time. In the interim she has been doing fairly well. She still smokes 1 pack/day. CTA did show dense coronary calcification, distended esophagus, evidence of emphysema as well as a large left adrenal fat-containing mass with at 5.5 cm. BNP slightly elevated at 1500  EKG shows low voltage, sinus rhythm, poor R wave progression in right precordial leads with questionable septal infarct. No significant ST-T wave changes noted at rest.    REVIEW OF SYSTEMS:  Review of Systems   Constitutional: Negative for activity change, fatigue and fever. HENT: Negative for ear pain, hearing loss and tinnitus. Eyes: Negative for discharge and visual disturbance. Respiratory: Positive for chest tightness and shortness of breath. Negative for cough and wheezing. Cardiovascular: Positive for chest pain and palpitations. Negative for leg swelling. Gastrointestinal: Negative for abdominal distention, blood in stool, constipation, diarrhea and vomiting. Endocrine: Negative for cold intolerance, heat intolerance, polydipsia and polyuria. Genitourinary: Negative for dysuria and hematuria. Musculoskeletal: Negative for arthralgias, back pain and myalgias. Skin: Negative for pallor and rash. Neurological: Negative for seizures, syncope, weakness and headaches. Psychiatric/Behavioral: Negative for behavioral problems and dysphoric mood.        Past Medical History:      Diagnosis Date    DJD (degenerative joint disease)     knees    Hypertension        Past Surgical History:      Procedure Laterality Date    CARPAL TUNNEL RELEASE      right arm     SECTION      CORONARY ANGIOPLASTY   and     HAND TENDON SURGERY      right    HYSTERECTOMY         Allergies:  Ace inhibitors and Augmentin [amoxicillin-pot clavulanate]    Past Social History:  Social History     Socioeconomic History    Marital status:  Spouse name: Not on file    Number of children: Not on file    Years of education: Not on file    Highest education level: Not on file   Occupational History    Not on file   Social Needs    Financial resource strain: Not on file    Food insecurity     Worry: Not on file     Inability: Not on file    Transportation needs     Medical: Not on file     Non-medical: Not on file   Tobacco Use    Smoking status: Former Smoker     Packs/day: 1.00     Types: Cigarettes     Last attempt to quit: 2014     Years since quittin.7    Smokeless tobacco: Never Used   Substance and Sexual Activity    Alcohol use: No    Drug use: No    Sexual activity: Not on file   Lifestyle    Physical activity     Days per week: Not on file     Minutes per session: Not on file    Stress: Not on file   Relationships    Social connections     Talks on phone: Not on file     Gets together: Not on file     Attends [employer] service: Not on file     Active member of club or organization: Not on file     Attends meetings of clubs or organizations: Not on file     Relationship status: Not on file    Intimate partner violence     Fear of current or ex partner: Not on file     Emotionally abused: Not on file     Physically abused: Not on file     Forced sexual activity: Not on file   Other Topics Concern    Not on file   Social History Narrative    Not on file       Family History:       Problem Relation Age of Onset    Heart Failure Mother          heart attack    Stroke Mother     Hypertension Mother     Cancer Other         Uncles    Hypertension Other         paternal side as well       Home Meds:  Prior to Admission medications    Medication Sig Start Date End Date Taking?  Authorizing Provider   olmesartan (BENICAR) 20 MG tablet Take 20 mg by mouth daily Pt is unsure of dose   Yes Historical Provider, MD   acetaminophen (TYLENOL) 500 MG tablet Take 1,000 mg by mouth every 6 hours as needed for Pain   Yes Historical Provider, MD   diltiazem (CARTIA XT) 240 MG extended release capsule Take 1 capsule by mouth daily 5/24/17 9/25/20 Yes Violeta Husbands, APRN - CNP   furosemide (LASIX) 20 MG tablet Take 1 tablet by mouth daily 5/24/17  Yes Violeta Husbands, RAFAT - CNP   cloNIDine (CATAPRES) 0.2 MG tablet Take 1 tablet by mouth daily  Patient taking differently: Take 0.2 mg by mouth nightly  5/24/17  Yes Violeta Husbands, APRN - CNP   omeprazole (PRILOSEC) 40 MG delayed release capsule TAKE ONE CAPSULE BY MOUTH ONCE DAILY 5/24/17  Yes Violeta HusbandRAFAT melendrez CNP       Current Meds:   losartan  100 mg Oral Daily    carvedilol  12.5 mg Oral BID WC    amLODIPine  5 mg Oral BID    isosorbide mononitrate  30 mg Oral Daily    furosemide  20 mg Oral Daily    pantoprazole  40 mg Oral QAM AC    sodium chloride flush  10 mL Intravenous 2 times per day    atorvastatin  40 mg Oral Nightly    aspirin  81 mg Oral Daily    enoxaparin  40 mg Subcutaneous Daily    nitroglycerin  1 inch Topical 4 times per day       Current Infused Meds:      Physical Exam:  Vitals:    09/26/20 0757   BP: (!) 172/90   Pulse: 81   Resp: 16   Temp: 98.2 °F (36.8 °C)   SpO2: 97%       Intake/Output Summary (Last 24 hours) at 9/26/2020 1322  Last data filed at 9/26/2020 1249  Gross per 24 hour   Intake 1500 ml   Output 3775 ml   Net -2275 ml     Estimated body mass index is 32.32 kg/m² as calculated from the following:    Height as of this encounter: 5' 5\" (1.651 m). Weight as of this encounter: 194 lb 4 oz (88.1 kg). Physical Exam  Constitutional:       General: She is not in acute distress. Appearance: She is not diaphoretic. HENT:      Mouth/Throat:      Pharynx: No oropharyngeal exudate. Eyes:      General: No scleral icterus. Right eye: No discharge. Left eye: No discharge. Neck:      Thyroid: No thyromegaly. Vascular: No JVD.    Cardiovascular:      Rate and Rhythm: Normal rate and regular rhythm. No extrasystoles are present. Heart sounds: Normal heart sounds, S1 normal and S2 normal. No murmur. No systolic murmur. No diastolic murmur. No friction rub. No gallop. No S3 or S4 sounds. Comments: No JVD  No edema  No significant systolic or diastolic murmurs appreciated  No gallop noted    Pulmonary:      Effort: Pulmonary effort is normal. No respiratory distress. Breath sounds: Normal breath sounds. No wheezing or rales. Chest:      Chest wall: No tenderness. Abdominal:      General: Bowel sounds are normal. There is no distension. Palpations: Abdomen is soft. There is no mass. Tenderness: There is no abdominal tenderness. There is no guarding or rebound. Hernia: No hernia is present. Musculoskeletal: Normal range of motion. Skin:     General: Skin is warm. Coloration: Skin is not pale. Findings: No rash. Neurological:      Mental Status: She is alert and oriented to person, place, and time. Cranial Nerves: No cranial nerve deficit. Deep Tendon Reflexes: Reflexes normal.           Labs:  Recent Labs     09/25/20  1738 09/26/20  0101   WBC 10.2 11.5*   HGB 14.9 12.8    265       Recent Labs     09/25/20  1738 09/26/20  0101    143   K 3.6 3.6    106   CO2 25 22   BUN 11 13   CREATININE 0.9 0.7   LABGLOM >60 >60   MG 2.1  --    CALCIUM 9.2 8.8   PHOS 3.7  --        CK, CKMB, Troponin: @LABRCNT (CKTOTAL:3, CKMB:3, TROPONINI:3)@    Last 3 BNP:          IMAGING:  Xr Chest Portable    Result Date: 9/25/2020  XR CHEST PORTABLE 9/25/2020 4:36 PM HISTORY: Shortness of air COMPARISON: None. CXR: A single frontal view of the chest is performed. Findings: Probable right basilar atelectasis. No dense consolidation or edema. Cardiac mediastinal contours normal. No pleural effusion or pneumothorax. No acute bony pathology. 1. Probable right basilar atelectasis, otherwise no acute radiographic cardiopulmonary process. . Signed by  PM          Assessment and Plan: This is a 63y.o. year old female with past medical history of coronary artery disease with known intermediate grade circumflex stenosis by catheterization 1/2014, hypertension, active ongoing tobacco use with recent onset of recurrent chest pain with shortness of breath and palpitations with hypertensive urgency. 1.  Given initial episode of left upper back pain which started her symptoms strong consideration for worsening and coronary artery disease especially in light of ongoing tobacco use is likely. I have discussed with the patient the absolute need for tobacco cessation. All preventative management will likely be futile going forward with ongoing tobacco use. Given her presentation and risk factors she would benefit from cardiac catheterization to rule out obstructive CAD as etiology for symptoms. The option of stress testing was also discussed with them. They would prefer to proceed with cardiac catheterization after full explanation of risks, benefits and alternatives. 2.  She does have hypertensive urgency. There is evidence for large left adrenal mass that appears fat-containing by radiology read. I have requested a plasma free metanephrine level. We will need to rule out pheochromocytoma. A renal artery duplex is also been requested. Would discontinue clonidine at nighttime as this increases the risk of rebound swings and hypertension. Discontinue Cardizem. Start Coreg 12.5 mg twice daily and Norvasc 5 mg twice daily. Add Imdur 30 mg once daily. Continue Lasix 20 mg daily. Risks, benefits, alternatives of cardiac catheterization/PCI discussed with the patient and full informed consent obtained.   Acceptable Mallampati score  Consent for moderate conscious sedation  ASA 3      Electronically signed by Caterina Chakraborty MD on 9/26/2020 at 1:22 PM

## 2020-09-26 NOTE — PROGRESS NOTES
Meka Medrano arrived to room # 710-2. Presented with: chest pain  Mental Status: Patient is oriented, alert and coherent. Vitals:    09/25/20 2213   BP: (!) 210/107   Pulse: 100   Resp: 18   Temp: 97.3 °F (36.3 °C)   SpO2: 98%     Patient safety contract and falls prevention contract reviewed with patient Yes. Oriented Patient to room. Call light within reach. Yes.   Needs, issues or concerns expressed at this time: no.      Electronically signed by Rayo Ayoub RN on 9/25/2020 at 10:47 PM

## 2020-09-26 NOTE — PROGRESS NOTES
BUN 11 13   CREATININE 0.9 0.7   GLUCOSE 118* 120*   CALCIUM 9.2 8.8     Recent Labs     09/25/20  1738   MG 2.1   PHOS 3.7     Recent Labs     09/25/20  1738   AST 16   ALT <5*   BILITOT <0.2   ALKPHOS 89     ABGs:No results for input(s): PH, PO2, PCO2, HCO3, BE, O2SAT in the last 72 hours. Troponin T:   Recent Labs     09/26/20  0101 09/26/20  0403 09/26/20  0635   TROPONINI <0.01 <0.01 <0.01     INR:   Recent Labs     09/25/20  1738   INR 1.13     Lactic Acid: No results for input(s): LACTA in the last 72 hours. Objective:   Vitals: BP (!) 172/90 Comment: reported to day shift rn  Pulse 81   Temp 98.2 °F (36.8 °C) (Temporal)   Resp 16   Ht 5' 5\" (1.651 m)   Wt 194 lb 4 oz (88.1 kg)   SpO2 97%   BMI 32.32 kg/m²   24HR INTAKE/OUTPUT:      Intake/Output Summary (Last 24 hours) at 9/26/2020 1106  Last data filed at 9/26/2020 0706  Gross per 24 hour   Intake 1500 ml   Output 1475 ml   Net 25 ml     General appearance: alert and cooperative with exam  HEENT: AT/NC  Lungs: no increased work of breathing, BLAE  Heart: RRR  Abdomen: Soft, BS+  Extremities: no edema  Neurologic: Alert, gross motor and sensory function intact  Skin: warm    Assessment and Plan:   Principal Problem:    Chest pain due to myocardial ischemia  Active Problems:    Essential hypertension    Elevated cholesterol    Vitamin D deficiency    Osteoporosis    Adrenal nodule (HCC)    Obesity (BMI 30-39. 9)  Resolved Problems:    * No resolved hospital problems. *    Chest pain/CHAVARRIA: Aspirin/Statin. TTE. Cardiology consult. Troponins unremarkable. Monitor on telemetry. HTN: Monitor BP and adjust medications PRN. Renal artery US performed. F/u plasma metanephrines. Tobacco abuse: Counseled regarding cessation.     Advance Directive: Full Code    DVT prophylaxis: Lovenox    Discharge planning: TBD      Signed:  Manda Guzmán MD 9/26/2020 11:06 AM  Rounding Hospitalist

## 2020-09-27 ENCOUNTER — APPOINTMENT (OUTPATIENT)
Dept: ULTRASOUND IMAGING | Age: 65
DRG: 247 | End: 2020-09-27
Payer: MEDICARE

## 2020-09-27 VITALS
HEIGHT: 65 IN | DIASTOLIC BLOOD PRESSURE: 95 MMHG | RESPIRATION RATE: 18 BRPM | WEIGHT: 191.38 LBS | TEMPERATURE: 98.4 F | OXYGEN SATURATION: 95 % | BODY MASS INDEX: 31.89 KG/M2 | HEART RATE: 76 BPM | SYSTOLIC BLOOD PRESSURE: 170 MMHG

## 2020-09-27 PROBLEM — R07.9 CHEST PAIN: Status: ACTIVE | Noted: 2020-09-27

## 2020-09-27 LAB
ANION GAP SERPL CALCULATED.3IONS-SCNC: 13 MMOL/L (ref 7–19)
BUN BLDV-MCNC: 18 MG/DL (ref 8–23)
CALCIUM SERPL-MCNC: 9.1 MG/DL (ref 8.8–10.2)
CHLORIDE BLD-SCNC: 106 MMOL/L (ref 98–111)
CO2: 23 MMOL/L (ref 22–29)
CREAT SERPL-MCNC: 0.7 MG/DL (ref 0.5–0.9)
GFR AFRICAN AMERICAN: >59
GFR NON-AFRICAN AMERICAN: >60
GLUCOSE BLD-MCNC: 100 MG/DL (ref 74–109)
HCT VFR BLD CALC: 37.3 % (ref 37–47)
HEMOGLOBIN: 12.6 G/DL (ref 12–16)
MCH RBC QN AUTO: 31.6 PG (ref 27–31)
MCHC RBC AUTO-ENTMCNC: 33.8 G/DL (ref 33–37)
MCV RBC AUTO: 93.5 FL (ref 81–99)
PDW BLD-RTO: 14.3 % (ref 11.5–14.5)
PLATELET # BLD: 260 K/UL (ref 130–400)
PMV BLD AUTO: 11.5 FL (ref 9.4–12.3)
POTASSIUM REFLEX MAGNESIUM: 3.8 MMOL/L (ref 3.5–5)
RBC # BLD: 3.99 M/UL (ref 4.2–5.4)
SODIUM BLD-SCNC: 142 MMOL/L (ref 136–145)
TROPONIN: <0.01 NG/ML (ref 0–0.03)
WBC # BLD: 9.5 K/UL (ref 4.8–10.8)

## 2020-09-27 PROCEDURE — 6370000000 HC RX 637 (ALT 250 FOR IP)

## 2020-09-27 PROCEDURE — 82088 ASSAY OF ALDOSTERONE: CPT

## 2020-09-27 PROCEDURE — 2500000003 HC RX 250 WO HCPCS

## 2020-09-27 PROCEDURE — 6370000000 HC RX 637 (ALT 250 FOR IP): Performed by: INTERNAL MEDICINE

## 2020-09-27 PROCEDURE — 36415 COLL VENOUS BLD VENIPUNCTURE: CPT

## 2020-09-27 PROCEDURE — 6360000002 HC RX W HCPCS: Performed by: HOSPITALIST

## 2020-09-27 PROCEDURE — 2140000000 HC CCU INTERMEDIATE R&B

## 2020-09-27 PROCEDURE — C1874 STENT, COATED/COV W/DEL SYS: HCPCS

## 2020-09-27 PROCEDURE — 6360000004 HC RX CONTRAST MEDICATION: Performed by: INTERNAL MEDICINE

## 2020-09-27 PROCEDURE — 99153 MOD SED SAME PHYS/QHP EA: CPT

## 2020-09-27 PROCEDURE — 2580000003 HC RX 258: Performed by: INTERNAL MEDICINE

## 2020-09-27 PROCEDURE — 2580000003 HC RX 258: Performed by: HOSPITALIST

## 2020-09-27 PROCEDURE — 84484 ASSAY OF TROPONIN QUANT: CPT

## 2020-09-27 PROCEDURE — 92928 PRQ TCAT PLMT NTRAC ST 1 LES: CPT

## 2020-09-27 PROCEDURE — 99152 MOD SED SAME PHYS/QHP 5/>YRS: CPT

## 2020-09-27 PROCEDURE — 92928 PRQ TCAT PLMT NTRAC ST 1 LES: CPT | Performed by: INTERNAL MEDICINE

## 2020-09-27 PROCEDURE — C1887 CATHETER, GUIDING: HCPCS

## 2020-09-27 PROCEDURE — 4A023N7 MEASUREMENT OF CARDIAC SAMPLING AND PRESSURE, LEFT HEART, PERCUTANEOUS APPROACH: ICD-10-PCS | Performed by: INTERNAL MEDICINE

## 2020-09-27 PROCEDURE — B2111ZZ FLUOROSCOPY OF MULTIPLE CORONARY ARTERIES USING LOW OSMOLAR CONTRAST: ICD-10-PCS | Performed by: INTERNAL MEDICINE

## 2020-09-27 PROCEDURE — C1894 INTRO/SHEATH, NON-LASER: HCPCS

## 2020-09-27 PROCEDURE — 6360000002 HC RX W HCPCS

## 2020-09-27 PROCEDURE — 93975 VASCULAR STUDY: CPT

## 2020-09-27 PROCEDURE — C1725 CATH, TRANSLUMIN NON-LASER: HCPCS

## 2020-09-27 PROCEDURE — 84244 ASSAY OF RENIN: CPT

## 2020-09-27 PROCEDURE — 85027 COMPLETE CBC AUTOMATED: CPT

## 2020-09-27 PROCEDURE — 80048 BASIC METABOLIC PNL TOTAL CA: CPT

## 2020-09-27 PROCEDURE — 93458 L HRT ARTERY/VENTRICLE ANGIO: CPT | Performed by: INTERNAL MEDICINE

## 2020-09-27 PROCEDURE — 2709999900 HC NON-CHARGEABLE SUPPLY

## 2020-09-27 PROCEDURE — 93458 L HRT ARTERY/VENTRICLE ANGIO: CPT

## 2020-09-27 PROCEDURE — 99152 MOD SED SAME PHYS/QHP 5/>YRS: CPT | Performed by: INTERNAL MEDICINE

## 2020-09-27 PROCEDURE — 027034Z DILATION OF CORONARY ARTERY, ONE ARTERY WITH DRUG-ELUTING INTRALUMINAL DEVICE, PERCUTANEOUS APPROACH: ICD-10-PCS | Performed by: INTERNAL MEDICINE

## 2020-09-27 PROCEDURE — C1769 GUIDE WIRE: HCPCS

## 2020-09-27 PROCEDURE — 6370000000 HC RX 637 (ALT 250 FOR IP): Performed by: HOSPITALIST

## 2020-09-27 PROCEDURE — B2151ZZ FLUOROSCOPY OF LEFT HEART USING LOW OSMOLAR CONTRAST: ICD-10-PCS | Performed by: INTERNAL MEDICINE

## 2020-09-27 RX ORDER — CLOPIDOGREL BISULFATE 75 MG/1
75 TABLET ORAL DAILY
Status: DISCONTINUED | OUTPATIENT
Start: 2020-09-28 | End: 2020-09-28 | Stop reason: HOSPADM

## 2020-09-27 RX ORDER — SODIUM CHLORIDE 0.9 % (FLUSH) 0.9 %
10 SYRINGE (ML) INJECTION PRN
Status: DISCONTINUED | OUTPATIENT
Start: 2020-09-27 | End: 2020-09-27 | Stop reason: SDUPTHER

## 2020-09-27 RX ORDER — NITROGLYCERIN 0.4 MG/1
0.4 TABLET SUBLINGUAL EVERY 5 MIN PRN
Status: DISCONTINUED | OUTPATIENT
Start: 2020-09-27 | End: 2020-09-28 | Stop reason: HOSPADM

## 2020-09-27 RX ORDER — ONDANSETRON 2 MG/ML
4 INJECTION INTRAMUSCULAR; INTRAVENOUS EVERY 6 HOURS PRN
Status: DISCONTINUED | OUTPATIENT
Start: 2020-09-27 | End: 2020-09-27 | Stop reason: SDUPTHER

## 2020-09-27 RX ORDER — SODIUM CHLORIDE 0.9 % (FLUSH) 0.9 %
10 SYRINGE (ML) INJECTION EVERY 12 HOURS SCHEDULED
Status: DISCONTINUED | OUTPATIENT
Start: 2020-09-27 | End: 2020-09-28 | Stop reason: HOSPADM

## 2020-09-27 RX ORDER — SODIUM CHLORIDE 9 MG/ML
INJECTION, SOLUTION INTRAVENOUS CONTINUOUS
Status: DISCONTINUED | OUTPATIENT
Start: 2020-09-27 | End: 2020-09-27 | Stop reason: SDUPTHER

## 2020-09-27 RX ORDER — SODIUM CHLORIDE 0.9 % (FLUSH) 0.9 %
10 SYRINGE (ML) INJECTION PRN
Status: DISCONTINUED | OUTPATIENT
Start: 2020-09-27 | End: 2020-09-28 | Stop reason: HOSPADM

## 2020-09-27 RX ORDER — ONDANSETRON 2 MG/ML
4 INJECTION INTRAMUSCULAR; INTRAVENOUS EVERY 6 HOURS PRN
Status: DISCONTINUED | OUTPATIENT
Start: 2020-09-27 | End: 2020-09-28 | Stop reason: HOSPADM

## 2020-09-27 RX ORDER — CLOPIDOGREL BISULFATE 75 MG/1
75 TABLET ORAL DAILY
Status: DISCONTINUED | OUTPATIENT
Start: 2020-09-27 | End: 2020-09-27 | Stop reason: SDUPTHER

## 2020-09-27 RX ORDER — ACETAMINOPHEN 325 MG/1
650 TABLET ORAL EVERY 4 HOURS PRN
Status: DISCONTINUED | OUTPATIENT
Start: 2020-09-27 | End: 2020-09-28 | Stop reason: HOSPADM

## 2020-09-27 RX ORDER — SODIUM CHLORIDE 9 MG/ML
INJECTION, SOLUTION INTRAVENOUS CONTINUOUS
Status: DISCONTINUED | OUTPATIENT
Start: 2020-09-27 | End: 2020-09-28 | Stop reason: HOSPADM

## 2020-09-27 RX ORDER — SODIUM CHLORIDE 0.9 % (FLUSH) 0.9 %
10 SYRINGE (ML) INJECTION EVERY 12 HOURS SCHEDULED
Status: DISCONTINUED | OUTPATIENT
Start: 2020-09-27 | End: 2020-09-27 | Stop reason: SDUPTHER

## 2020-09-27 RX ADMIN — FUROSEMIDE 20 MG: 40 TABLET ORAL at 08:20

## 2020-09-27 RX ADMIN — ISOSORBIDE MONONITRATE 30 MG: 30 TABLET, EXTENDED RELEASE ORAL at 08:20

## 2020-09-27 RX ADMIN — SODIUM CHLORIDE, PRESERVATIVE FREE 10 ML: 5 INJECTION INTRAVENOUS at 08:21

## 2020-09-27 RX ADMIN — AMLODIPINE BESYLATE 5 MG: 5 TABLET ORAL at 08:20

## 2020-09-27 RX ADMIN — SODIUM CHLORIDE: 9 INJECTION, SOLUTION INTRAVENOUS at 08:13

## 2020-09-27 RX ADMIN — AMLODIPINE BESYLATE 5 MG: 5 TABLET ORAL at 22:36

## 2020-09-27 RX ADMIN — LOSARTAN POTASSIUM 100 MG: 100 TABLET, FILM COATED ORAL at 08:19

## 2020-09-27 RX ADMIN — IOPAMIDOL 172 ML: 612 INJECTION, SOLUTION INTRAVENOUS at 12:12

## 2020-09-27 RX ADMIN — PANTOPRAZOLE SODIUM 40 MG: 40 TABLET, DELAYED RELEASE ORAL at 08:31

## 2020-09-27 RX ADMIN — ASPIRIN 81 MG: 81 TABLET, CHEWABLE ORAL at 08:19

## 2020-09-27 RX ADMIN — ENOXAPARIN SODIUM 40 MG: 40 INJECTION SUBCUTANEOUS at 14:14

## 2020-09-27 RX ADMIN — CARVEDILOL 12.5 MG: 12.5 TABLET, FILM COATED ORAL at 08:19

## 2020-09-27 RX ADMIN — CARVEDILOL 12.5 MG: 12.5 TABLET, FILM COATED ORAL at 17:17

## 2020-09-27 RX ADMIN — NITROGLYCERIN 1 INCH: 20 OINTMENT TOPICAL at 06:27

## 2020-09-27 ASSESSMENT — PAIN SCALES - GENERAL
PAINLEVEL_OUTOF10: 0

## 2020-09-27 NOTE — PROGRESS NOTES
Alta Means received from Cardiac cath lab, returned to room # 710-2 . Mental Status: Patient is oriented, alert, thought processes intact and able to concentrate and follow conversation. Vital Signs:  BP:144/77  HR:66  Temp: 97.3 degrees  RR:14   SpO2: 96% on RA  Patient denies chest pain at this time. TR band  Inflated to 13mL per Kit Jennifer SHETH, R radial site, C/D/I. Pulses: 2+. Placed on cardiac monitor: Yes. Box # 710-2. Belongings: Dentures parital upper plate, cellphone, and clothing with patient at bedside . Family at bedside Yes, Melanie De Leon, her   Oriented Patient, Family to room. Call light within reach. Yes. Transfer was: Well tolerated by patient. .    Electronically signed by Eliza Delgado RN on 9/27/2020 at 2:22 PM

## 2020-09-27 NOTE — PROGRESS NOTES
Spoke with lonnie in lab and was advised that the test salivary cortisol is a test that is sent out and will be ran on Monday.  The outside lab recommends that the sample needs to be collected between 11pm and 1 am.

## 2020-09-27 NOTE — PROGRESS NOTES
Cardiac catheterization preliminary note:    Obstructive proximal to mid circumflex lesion. Intermediate ostial LAD stenosis. Calcific coronary disease. Normal LV ejection fraction. Successful PCI to proximal to mid circumflex with RIN. Plan: Medical management with Plavix uninterrupted for 1 year. Smoking cessation. Patient may be discharged in 6 hours if no significant issues. Can follow-up with cardiology in 4-6 weeks.

## 2020-09-27 NOTE — PROGRESS NOTES
[facility]        Hospitalist Progress Note  9/27/2020 12:59 PM  Subjective:   Admit Date: 9/25/2020  PCP: Dolly Dixon LPN    Chief Complaint: Dyspnea on exertion    Subjective: Patient seen and examined at bedside with family member present s/p [facility] with stent placement. Denies chest pain or shortness of breath. Comfortable. Wants to go home. [facility]: 81-year-old female with a history of hypertension and CAD presenting to the hospital for worsening dyspnea on exertion associated with palpitations and presyncopal episode with cardiology consulted on admission. Patient also noted to have 5.5 cm fat-containing adrenal nodule on CT scanning. CTA PE study performed showing no evidence of pulmonary emboli. Hormone evaluation ongoing for adrenal nodule. Patient is status post left heart catheterization 9/27/2020 with drug-eluting stent placed to left circumflex. Renal artery ultrasound performed and is unremarkable. ROS: 14 point review of systems is negative except as specifically addressed above.     DIET CARDIAC; No Caffeine    Intake/Output Summary (Last 24 hours) at 9/27/2020 1259  Last data filed at 9/27/2020 1000  Gross per 24 hour   Intake 583.75 ml   Output 500 ml   Net 83.75 ml     Medications:   sodium chloride 75 mL/hr at 09/27/20 0813     Current Facility-Administered Medications   Medication Dose Route Frequency Provider Last Rate Last Dose    0.9 % sodium chloride infusion   Intravenous Continuous Jennifer Nolasco MD 75 mL/hr at 09/27/20 0813      sodium chloride flush 0.9 % injection 10 mL  10 mL Intravenous 2 times per day Jennifer Nolasco MD        sodium chloride flush 0.9 % injection 10 mL  10 mL Intravenous PRN Jennifer Nolasco MD        ondansetron [facility]) injection 4 mg  4 mg Intravenous Q6H PRN Jennifer Nolasco MD        nitroGLYCERIN (NITROSTAT) SL tablet 0.4 mg  0.4 mg Sublingual Q5 Min PRN Jennifer Nolasco MD        losartan (COZAAR) tablet 100 mg  100 mg Oral Daily Rory Morrissey Mook Orellana MD        Or    ondansetron [facility]) injection 4 mg  4 mg Intravenous Q6H PRN Lilo Carcamo MD        polyethylene glycol [facility]) packet 17 g  17 g Oral Daily PRN Lilo Carcamo MD        melatonin tablet 3 mg  3 mg Oral Nightly PRN Lilo Carcamo MD   3 mg at 09/26/20 2001    enoxaparin (LOVENOX) injection 40 mg  40 mg Subcutaneous Daily Lilo Carcamo MD   40 mg at 09/26/20 1056    nitroglycerin (NITRO-BID) 2 % ointment 1 inch  1 inch Topical 4 times per day Lilo Carcamo MD   1 inch at 09/27/20 0627        Labs:     Recent Labs     09/25/20 1738 09/26/20 0101 09/27/20 0220   WBC 10.2 11.5* 9.5   RBC 4.79 4.09* 3.99*   HGB 14.9 12.8 12.6   HCT 44.7 39.0 37.3   MCV 93.3 95.4 93.5   MCH 31.1* 31.3* 31.6*   MCHC 33.3 32.8* 33.8    265 260     Recent Labs     09/25/20 1738 09/26/20 0101 09/27/20 0220    143 142   K 3.6 3.6 3.8   ANIONGAP 11 15 13    106 106   CO2 25 22 23   BUN 11 13 18   CREATININE 0.9 0.7 0.7   GLUCOSE 118* 120* 100   CALCIUM 9.2 8.8 9.1     Recent Labs     09/25/20 1738   MG 2.1   PHOS 3.7     Recent Labs     09/25/20 1738   AST 16   ALT <5*   BILITOT <0.2   ALKPHOS 89     ABGs:No results for input(s): PH, PO2, PCO2, HCO3, BE, O2SAT in the last 72 hours. Troponin T:   Recent Labs     09/26/20 0101 09/26/20  0403 09/26/20  0635   TROPONINI <0.01 <0.01 <0.01     INR:   Recent Labs     09/25/20 1738   INR 1.13     Lactic Acid: No results for input(s): LACTA in the last 72 hours.     Objective:   Vitals: /82   Pulse 64   Temp 97.6 °F (36.4 °C) (Temporal)   Resp 18   Ht 5' 5\" (1.651 m)   Wt 191 lb 6 oz (86.8 kg)   SpO2 90%   BMI 31.85 kg/m²   24HR INTAKE/OUTPUT:      Intake/Output Summary (Last 24 hours) at 9/27/2020 1259  Last data filed at 9/27/2020 1000  Gross per 24 hour   Intake 583.75 ml   Output 500 ml   Net 83.75 ml     General appearance: alert and cooperative with exam  HEENT: AT/NC  Lungs: no increased work of breathing, BLAE  Heart: RRR  Abdomen: Soft, BS+  Extremities: no edema  Neurologic: Alert, gross motor and sensory function intact  Skin: warm    Assessment and Plan:   Principal Problem:    Chest pain due to myocardial ischemia  Active Problems:    Essential hypertension    Elevated cholesterol    Vitamin D deficiency    Osteoporosis    Adrenal nodule (HCC)    Obesity (BMI 30-39. 9)    Chest pain  Resolved Problems:    * No resolved hospital problems. *    Chest pain/CHAVARRIA: RIN placed to LCX 9/27/2020. DAPT/Statin. TTE noted. Cardiology on board. Monitor on telemetry. HTN: Improved control. Monitor BP and adjust medications PRN. Renal artery US unremarkable. Adrenal Nodule: Benign appearing on imaging. Hormone evaluation (metanephrines, aldosterone/renin activity, salivary cortisol). Patient will require outpatient follow-up regarding this. Tobacco abuse: Counseled regarding cessation.     Advance Directive: Full Code    DVT prophylaxis: Lovenox    Discharge planning: TBD - hopefully home tomorrow AM.      Signed:  Brie Romeo MD 9/27/2020 12:59 PM  Rounding Hospitalist

## 2020-09-28 PROBLEM — I25.9 CHEST PAIN DUE TO MYOCARDIAL ISCHEMIA: Status: RESOLVED | Noted: 2020-09-25 | Resolved: 2020-09-28

## 2020-09-28 PROBLEM — R07.9 CHEST PAIN: Status: RESOLVED | Noted: 2020-09-27 | Resolved: 2020-09-28

## 2020-09-28 LAB
ANION GAP SERPL CALCULATED.3IONS-SCNC: 13 MMOL/L (ref 7–19)
BUN BLDV-MCNC: 12 MG/DL (ref 8–23)
CALCIUM SERPL-MCNC: 8.8 MG/DL (ref 8.8–10.2)
CHLORIDE BLD-SCNC: 106 MMOL/L (ref 98–111)
CO2: 21 MMOL/L (ref 22–29)
CREAT SERPL-MCNC: 0.5 MG/DL (ref 0.5–0.9)
GFR AFRICAN AMERICAN: >59
GFR NON-AFRICAN AMERICAN: >60
GLUCOSE BLD-MCNC: 87 MG/DL (ref 74–109)
HCT VFR BLD CALC: 39.5 % (ref 37–47)
HEMOGLOBIN: 13.2 G/DL (ref 12–16)
MCH RBC QN AUTO: 31.8 PG (ref 27–31)
MCHC RBC AUTO-ENTMCNC: 33.4 G/DL (ref 33–37)
MCV RBC AUTO: 95.2 FL (ref 81–99)
PDW BLD-RTO: 14.1 % (ref 11.5–14.5)
PLATELET # BLD: 259 K/UL (ref 130–400)
PMV BLD AUTO: 10.6 FL (ref 9.4–12.3)
POTASSIUM REFLEX MAGNESIUM: 4 MMOL/L (ref 3.5–5)
RBC # BLD: 4.15 M/UL (ref 4.2–5.4)
SODIUM BLD-SCNC: 140 MMOL/L (ref 136–145)
TROPONIN: <0.01 NG/ML (ref 0–0.03)
WBC # BLD: 8.6 K/UL (ref 4.8–10.8)

## 2020-09-28 PROCEDURE — 84484 ASSAY OF TROPONIN QUANT: CPT

## 2020-09-28 PROCEDURE — 85027 COMPLETE CBC AUTOMATED: CPT

## 2020-09-28 PROCEDURE — 36415 COLL VENOUS BLD VENIPUNCTURE: CPT

## 2020-09-28 PROCEDURE — 80048 BASIC METABOLIC PNL TOTAL CA: CPT

## 2020-09-28 PROCEDURE — 82533 TOTAL CORTISOL: CPT

## 2020-09-28 RX ORDER — AMLODIPINE BESYLATE 5 MG/1
5 TABLET ORAL 2 TIMES DAILY
Qty: 30 TABLET | Refills: 3 | Status: SHIPPED | OUTPATIENT
Start: 2020-09-28 | End: 2021-04-06 | Stop reason: SINTOL

## 2020-09-28 RX ORDER — ATORVASTATIN CALCIUM 40 MG/1
40 TABLET, FILM COATED ORAL NIGHTLY
Qty: 30 TABLET | Refills: 3 | Status: SHIPPED | OUTPATIENT
Start: 2020-09-28 | End: 2020-10-05 | Stop reason: SINTOL

## 2020-09-28 RX ORDER — ISOSORBIDE MONONITRATE 30 MG/1
30 TABLET, EXTENDED RELEASE ORAL DAILY
Qty: 30 TABLET | Refills: 3 | Status: SHIPPED | OUTPATIENT
Start: 2020-09-28

## 2020-09-28 RX ORDER — CLOPIDOGREL BISULFATE 75 MG/1
75 TABLET ORAL DAILY
Qty: 30 TABLET | Refills: 3 | Status: SHIPPED | OUTPATIENT
Start: 2020-09-28

## 2020-09-28 RX ORDER — NITROGLYCERIN 0.4 MG/1
TABLET SUBLINGUAL
Qty: 25 TABLET | Refills: 3 | Status: SHIPPED | OUTPATIENT
Start: 2020-09-28

## 2020-09-28 RX ORDER — CARVEDILOL 12.5 MG/1
12.5 TABLET ORAL 2 TIMES DAILY WITH MEALS
Qty: 60 TABLET | Refills: 3 | Status: SHIPPED | OUTPATIENT
Start: 2020-09-28 | End: 2021-04-06 | Stop reason: SINTOL

## 2020-09-28 RX ORDER — ASPIRIN 81 MG/1
81 TABLET, CHEWABLE ORAL DAILY
Qty: 30 TABLET | Refills: 3 | Status: SHIPPED | OUTPATIENT
Start: 2020-09-28

## 2020-09-28 RX ORDER — LOSARTAN POTASSIUM 100 MG/1
100 TABLET ORAL DAILY
Qty: 30 TABLET | Refills: 5 | Status: SHIPPED | OUTPATIENT
Start: 2020-09-28

## 2020-09-28 NOTE — DISCHARGE SUMMARY
20 cm/s. Left renal artery/abdominal aorta velocity ratios are 1.08, 0.67,    0.82, and 0.22. Resistive index measurements are mildly elevated on the left.         Impression    1. No significant renal artery stenosis based on velocity    measurements. 2. Stable fatty appearing left adrenal mass. Signed by Dr Cleo Mancilla on 9/27/2020 11:11 AM      Narrative    CTA chest 9/25/2020 6:59 PM    HISTORY: Shortness of air    TECHNIQUE: Axial images of the chest were obtained following IV    contrast. . Coronal and sagittal maximum intensity projectional    reformatted images are reconstructed and reviewed. COMPARISON: None. DLP: 679 mGy cm Automated exposure control was utilized to minimize    patient radiation dose. FINDINGS:    There are no pulmonary emboli identified. The thoracic aorta is normal    in caliber with no aneurysm or dissection. Moderate to advanced    coronary calcification. Mild cardiomegaly. Trace pericardial fluid or    thickening. Small hiatal hernia. No pleural effusions. No pathologic    intrathoracic or axillary lymphadenopathy. Fluid present within the    esophagus may relate to poor primary esophageal peristalsis/lower    esophageal stenosis, or gastroesophageal reflux. 5.5 cm fatty-containing left adrenal nodule favorable for a prominent    myelolipoma. This is not an acute finding. Cholecystectomy clips. Mild    vascular calcification. Mild emphysematous changes of the lungs with basilar atelectasis. No    lobar consolidation. No suspicious pulmonary nodules. No pneumothorax. No discrete endobronchial lesions. Moderate degenerative change of the thoracic spine. No focal    destructive osseous lesions.         Impression    1. No pulmonary emboli. 2. Dense coronary artery calcifications. Mild cardiomegaly. Trace    pericardial fluid or thickening. No thoracic aortic aneurysm or    dissection.     3. Mild fluid distended esophagus may be due to 2.4 mg/dL 2.1     Glucose Latest Ref Range: 74 - 109 mg/dL 118 (H) 120 (H) 100   Calcium Latest Ref Range: 8.8 - 10.2 mg/dL 9.2 8.8 9.1   Phosphorus Latest Ref Range: 2.5 - 4.5 mg/dL 3.7     Total Protein Latest Ref Range: 6.6 - 8.7 g/dL 6.4 (L)     Results for Brandie Colin (MRN [id number]) as of 9/28/2020 03:49   Ref. Range 9/25/2020 17:38 9/26/2020 01:01 9/27/2020 02:20 9/28/2020 03:11   WBC Latest Ref Range: 4.8 - 10.8 K/uL 10.2 11.5 (H) 9.5 8.6   RBC Latest Ref Range: 4.20 - 5.40 M/uL 4.79 4.09 (L) 3.99 (L) 4.15 (L)   Hemoglobin Quant Latest Ref Range: 12.0 - 16.0 g/dL 14.9 12.8 12.6 13.2   Hematocrit Latest Ref Range: 37.0 - 47.0 % 44.7 39.0 37.3 39.5   MCV Latest Ref Range: 81.0 - 99.0 fL 93.3 95.4 93.5 95.2   MCH Latest Ref Range: 27.0 - 31.0 pg 31.1 (H) 31.3 (H) 31.6 (H) 31.8 (H)   MCHC Latest Ref Range: 33.0 - 37.0 g/dL 33.3 32.8 (L) 33.8 33.4   MPV Latest Ref Range: 9.4 - 12.3 fL 10.6 11.1 11.5 10.6   RDW Latest Ref Range: 11.5 - 14.5 % 14.6 (H) 14.5 14.3 14.1   Platelet Count Latest Ref Range: 130 - 400 K/uL 293 265 260 259   Neutrophils % Latest Ref Range: 50.0 - 65.0 % 58.0      Lymphocyte % Latest Ref Range: 20.0 - 40.0 % 31.7      Monocytes % Latest Ref Range: 0.0 - 10.0 % 8.7      Eosinophils % Latest Ref Range: 0.0 - 5.0 % 0.8      Basophils % Latest Ref Range: 0.0 - 1.0 % 0.5      Neutrophils Absolute Latest Ref Range: 1.5 - 7.5 K/uL 5.9      Immature Granulocytes # Latest Units: K/uL 0.0      Lymphocytes Absolute Latest Ref Range: 1.1 - 4.5 K/uL 3.2      Monocytes Absolute Latest Ref Range: 0.00 - 0.90 K/uL 0.90      Eosinophils Absolute Latest Ref Range: 0.00 - 0.60 K/uL 0.10      Basophils Absolute Latest Ref Range: 0.00 - 0.20 K/uL 0.10      Results for Brandie Colin (MRN [id number]) as of 9/28/2020 03:49   Ref.  Range 9/25/2020 17:38 9/25/2020 18:56   Prothrombin Time Latest Ref Range: 12.0 - 14.6 sec 14.5    INR Latest Ref Range: 0.88 - 1.18  1.13    aPTT Latest Ref Range: 26.0 - 36.2 sec 32.0 discharge. Left eye: No discharge. Neck:      Musculoskeletal: Neck supple. Comments: Trachea appears midline  Cardiovascular:      Rate and Rhythm: Normal rate and regular rhythm. Heart sounds: No murmur. No friction rub. No gallop. Pulmonary:      Effort: Pulmonary effort is normal. No respiratory distress. Breath sounds: No stridor. No wheezing, rhonchi or rales. Chest:      Chest wall: No tenderness. Abdominal:      General: Bowel sounds are normal.      Tenderness: There is no abdominal tenderness. There is no guarding or rebound. Skin:     General: Skin is warm. Comments: nondiaphoretic   Neurological:      Mental Status: She is alert and oriented to person, place, and time. Psychiatric:         Mood and Affect: Mood normal.         Behavior: Behavior normal.           Disposition: home    Medication List     Current Discharge Medication List   START taking these medications   Medication  Dose    clopidogrel (PLAVIX) 75 MG tablet  75 mg    Take 1 tablet by mouth daily    Quantity: 30 tablet  Refills: 3        carvedilol (COREG) 12.5 MG tablet  12.5 mg    Take 1 tablet by mouth 2 times daily (with meals)    Quantity: 60 tablet  Refills: 3        atorvastatin (LIPITOR) 40 MG tablet  40 mg    Take 1 tablet by mouth nightly    Quantity: 30 tablet  Refills: 3        amLODIPine (NORVASC) 5 MG tablet  5 mg    Take 1 tablet by mouth 2 times daily    Quantity: 30 tablet  Refills: 3        aspirin 81 MG chewable tablet  81 mg    Take 1 tablet by mouth daily    Quantity: 30 tablet  Refills: 3        isosorbide mononitrate (IMDUR) 30 MG extended release tablet  30 mg    Take 1 tablet by mouth daily    Quantity: 30 tablet  Refills: 3        nitroGLYCERIN (NITROSTAT) 0.4 MG SL tablet     up to max of 3 total doses. If no relief after 1 dose, call 911.     Quantity: 25 tablet  Refills: 3            CONTINUE these medications which have CHANGED or have new prescriptions   Medication Dose    losartan (COZAAR) 100 MG tablet  100 mg    Take 1 tablet by mouth daily    Quantity: 30 tablet  Refills: 5            CONTINUE these medications which have NOT CHANGED   Medication  Dose    furosemide (LASIX) 20 MG tablet  20 mg    Take 1 tablet by mouth daily    Quantity: 30 tablet  Refills: 5        omeprazole (PRILOSEC) 40 MG delayed release capsule     TAKE ONE CAPSULE BY MOUTH ONCE DAILY    Quantity: 30 capsule  Refills: 5            STOP taking these previous medications   Medication  Dose  Reason for Stopping  Comments    (STOP TAKING) olmesartan (BENICAR) 20 MG tablet  20 mg            (STOP TAKING) acetaminophen (TYLENOL) 500 MG tablet  1,000 mg            (STOP TAKING) diltiazem (CARTIA XT) 240 MG extended release capsule  240 mg            (STOP TAKING) cloNIDine (CATAPRES) 0.2 MG tablet  0.2 mg            (STOP TAKING) acetaminophen (TYLENOL) 500 MG tablet  1,500 mg            (STOP TAKING) traMADol (ULTRAM) 50 MG tablet   mg            (STOP TAKING) ibuprofen (ADVIL;MOTRIN) 600 MG tablet  600 mg            (STOP TAKING) clotrimazole-betamethasone (LOTRISONE) 1-0.05 % cream             (STOP TAKING) hydrocortisone (ANUSOL-HC) 25 MG suppository  25 mg            (STOP TAKING) albuterol (PROVENTIL) (2.5 MG/3ML) 0.083% nebulizer solution  2.5 mg            (STOP TAKING) hydrocortisone (ANUSOL-HC) 2.5 % rectal cream             Patient Instructions: Activity: activity as tolerated and no driving for today  Diet: cardiac diet  Wound Care: as directed    Follow-up with your Primary Care Provider in 1 week. You need to have them follow up the Urine Metanephrine & Catecholamine tests as well as the salivary cortisol. Follow up with Cardiology as scheduled.     Signed:  Ta Loera  9/28/2020  3:54 AM     Care time >30 minutes in discharge of patient

## 2020-09-28 NOTE — CONSULTS
Cardiac Rehab MI/PTCA/Stent education packet was sent to the patient's address on record. Handouts included were titled; \"Home Instructions Following a Cardiac Event\", \"Cardiac Home Exercise Program - Phase I\", \"Risk Factors for Heart Disease and Stroke\" and \"Cardiac Diet/Low Cholesterol\". Patient was instructed to contact [facility] or the hospital nearest their residence for the opportunity to enroll in Phase II Outpatient Cardiac Rehab.

## 2020-09-28 NOTE — PROGRESS NOTES
Pt had a small wound proximal to puncture  heart cath site that kept oozing. Actual heart cath site achieved hemostasis. Dry dessing and tegaderm applied before discharge.  Electronically signed by Subha Doyle RN on 9/28/2020 at 4:48 AM

## 2020-09-29 LAB
EKG P AXIS: 29 DEGREES
EKG P-R INTERVAL: 144 MS
EKG Q-T INTERVAL: 416 MS
EKG QRS DURATION: 86 MS
EKG QTC CALCULATION (BAZETT): 444 MS
EKG T AXIS: 19 DEGREES

## 2020-09-29 PROCEDURE — 93010 ELECTROCARDIOGRAM REPORT: CPT | Performed by: INTERNAL MEDICINE

## 2020-10-01 LAB
ALDOSTERONE/RENIN ACTIVITY CALCULATION: NORMAL RATIO
ALDOSTERONE: 4.1 NG/DL
CORTISOL SALIVARY: 0.05 UG/DL
RENIN ACTIVITY: <0.1 NG/ML/HR

## 2020-10-05 ENCOUNTER — OFFICE VISIT (OUTPATIENT)
Dept: CARDIOLOGY | Age: 65
End: 2020-10-05
Payer: MEDICARE

## 2020-10-05 VITALS
HEART RATE: 80 BPM | DIASTOLIC BLOOD PRESSURE: 80 MMHG | SYSTOLIC BLOOD PRESSURE: 138 MMHG | BODY MASS INDEX: 32.49 KG/M2 | HEIGHT: 65 IN | WEIGHT: 195 LBS

## 2020-10-05 PROBLEM — Z95.5 HISTORY OF CORONARY ARTERY STENT PLACEMENT: Status: ACTIVE | Noted: 2020-10-05

## 2020-10-05 PROBLEM — I25.10 CORONARY ARTERY DISEASE INVOLVING NATIVE CORONARY ARTERY OF NATIVE HEART WITHOUT ANGINA PECTORIS: Status: ACTIVE | Noted: 2020-10-05

## 2020-10-05 PROBLEM — E78.2 MIXED HYPERLIPIDEMIA: Status: ACTIVE | Noted: 2020-10-05

## 2020-10-05 PROCEDURE — 1111F DSCHRG MED/CURRENT MED MERGE: CPT | Performed by: NURSE PRACTITIONER

## 2020-10-05 PROCEDURE — G8484 FLU IMMUNIZE NO ADMIN: HCPCS | Performed by: NURSE PRACTITIONER

## 2020-10-05 PROCEDURE — G8427 DOCREV CUR MEDS BY ELIG CLIN: HCPCS | Performed by: NURSE PRACTITIONER

## 2020-10-05 PROCEDURE — 3017F COLORECTAL CA SCREEN DOC REV: CPT | Performed by: NURSE PRACTITIONER

## 2020-10-05 PROCEDURE — 1036F TOBACCO NON-USER: CPT | Performed by: NURSE PRACTITIONER

## 2020-10-05 PROCEDURE — G8417 CALC BMI ABV UP PARAM F/U: HCPCS | Performed by: NURSE PRACTITIONER

## 2020-10-05 PROCEDURE — 99214 OFFICE O/P EST MOD 30 MIN: CPT | Performed by: NURSE PRACTITIONER

## 2020-10-05 NOTE — PATIENT INSTRUCTIONS
New instructions for today:    Discuss trying Zetia or Repatha for your cholesterol. You will need to stay on Plavix and aspirin for one year after stent placed - through 9/27/21. After that date, you can stop Plavix and continue low dose enteric coated aspirin once daily. Do not schedule elective surgical procedures or invasive until after 9/27/21. Patient Instructions:  Continue current medications as prescribed. Always keep a current medication list. Bring your medications to every office visit. Continue to follow up with primary care provider for non cardiac medical problems. Call the office with any problems, questions or concerns at [phone]. If you have been asked to keep a blood pressure log, do so for 2 weeks. Call the office to report readings to the triage nurse at [phone]. Follow up with cardiologist as scheduled. The following educational material has been included in this after visit summary for your review: Life simple 7. Heart health. Life simple 7  1) Manage blood pressure - high blood pressure is a major risk factor for heart disease and stroke. Keeping blood pressure in health range reduces strain on your heart, arteries and kidneys. Blood pressure goal is less than 130/80. 2) Control cholesterol - contributes to plaque, which can clog arteries and lead to heart disease and stroke. When you control your cholesterol you are giving your arteries their best chance to remain clear. It is recommended that you get cholesterol lab work done once a year. 3) Reduce blood sugar - most of the food we eat is turning into glucose or blood sugar that our body uses for energy. Over time, high levels of blood sugar can damage your heart, kidneys, eyes and nerves. 4) Get active - living an active life is one of the most rewarding gifts you can give yourself and those you love. Simply put, daily physical activity increases your length and quality of life.  Strive to exercise 15 minutes most days of the week. 5)  Eat better - A healthy diet is one of your best weapons for fighting cardiovascular disease. When you eat a heart healthy diet, you improve your chances for feeling good and staying healthy for life. 6)  Lose weight - when you shed extra fat an unnecessary pounds, you reduce the burden on your hear, lungs, blood vessels and skeleton. You give yourself the gift of active living, you lower your blood pressure and help yourself feel better. 7) Stop smoking - cigarette smokers have a higher risk of developing cardiovascular disease. If  You smoke, quitting is the best thing you can do for your health. Check American Heart Association on line for more information on Life's Simple 7 and tips for healthy living. A Healthy Heart: Care Instructions  Your Care Instructions     Coronary artery disease, also called heart disease, occurs when a substance called plaque builds up in the vessels that supply oxygen-rich blood to your heart muscle. This can narrow the blood vessels and reduce blood flow. A heart attack happens when blood flow is completely blocked. A high-fat diet, smoking, and other factors increase the risk of heart disease. Your doctor has found that you have a chance of having heart disease. You can do lots of things to keep your heart healthy. It may not be easy, but you can change your diet, exercise more, and quit smoking. These steps really work to lower your chance of heart disease. Follow-up care is a key part of your treatment and safety. Be sure to make and go to all appointments, and call your doctor if you are having problems. It's also a good idea to know your test results and keep a list of the medicines you take. How can you care for yourself at home? Diet  · Use less salt when you cook and eat. This helps lower your blood pressure. Taste food before salting. Add only a little salt when you think you need it.  With time, your taste buds will adjust to less salt. · Eat fewer snack items, fast foods, canned soups, and other high-salt, high-fat, processed foods. · Read food labels and try to avoid saturated and trans fats. They increase your risk of heart disease by raising cholesterol levels. · Limit the amount of solid fat-butter, margarine, and shortening-you eat. Use olive, peanut, or canola oil when you cook. Bake, broil, and steam foods instead of frying them. · Eat a variety of fruit and vegetables every day. Dark green, deep orange, red, or yellow fruits and vegetables are especially good for you. Examples include spinach, carrots, peaches, and berries. · Foods high in fiber can reduce your cholesterol and provide important vitamins and minerals. High-fiber foods include whole-grain cereals and breads, oatmeal, beans, brown rice, citrus fruits, and apples. · Eat lean proteins. Heart-healthy proteins include seafood, lean meats and poultry, eggs, beans, peas, nuts, seeds, and soy products. · Limit drinks and foods with added sugar. These include candy, desserts, and soda pop. Lifestyle changes  · If your doctor recommends it, get more exercise. Walking is a good choice. Bit by bit, increase the amount you walk every day. Try for at least 30 minutes on most days of the week. You also may want to swim, bike, or do other activities. · Do not smoke. If you need help quitting, talk to your doctor about stop-smoking programs and medicines. These can increase your chances of quitting for good. Quitting smoking may be the most important step you can take to protect your heart. It is never too late to quit. · Limit alcohol to 2 drinks a day for men and 1 drink a day for women. Too much alcohol can cause health problems. · Manage other health problems such as diabetes, high blood pressure, and high cholesterol. If you think you may have a problem with alcohol or drug use, talk to your doctor. Medicines  · Take your medicines exactly as prescribed.  Call your doctor if you think you are having a problem with your medicine. · If your doctor recommends aspirin, take the amount directed each day. Make sure you take aspirin and not another kind of pain reliever, such as acetaminophen (Tylenol). When should you call for help? [id number] if you have symptoms of a heart attack. These may include:  · Chest pain or pressure, or a strange feeling in the chest.  · Sweating. · Shortness of breath. · Pain, pressure, or a strange feeling in the back, neck, jaw, or upper belly or in one or both shoulders or arms. · Lightheadedness or sudden weakness. · A fast or irregular heartbeat. After you call 911, the  may tell you to chew 1 adult-strength or 2 to 4 low-dose aspirin. Wait for an ambulance. Do not try to drive yourself. Watch closely for changes in your health, and be sure to contact your doctor if you have any problems. Where can you learn more? Go to [email or web address]://Inkive.[employer]. org and sign in to your [employer] account. Enter U322 in the [facility] box to learn more about \"A Healthy Heart: Care Instructions. \"     If you do not have an account, please click on the \"Sign Up Now\" link. Current as of: December 16, 2019               Content Version: 12.5  © 8412-7500 [employer]. Care instructions adapted under license by [facility] ([facility]). If you have questions about a medical condition or this instruction, always ask your healthcare professional. [place] [address] any warranty or liability for your use of this information. Patient Education        ezetimibe  Pronunciation:  ez ET i mibe  Brand:  Zetia  What is the most important information I should know about ezetimibe? Some cholesterol medications should not be taken at the same time. If you take ezetimibe with another cholesterol medicine, follow your doctor's dosing instructions very carefully.   You should not use ezetimibe if you have moderate to severe liver disease. You should not use ezetimibe with a \"statin\" cholesterol medicine if you have active liver disease, or if you are pregnant or breast-feeding a baby. Call your doctor right away if you have unexplained muscle pain, tenderness, or weakness especially if you also have fever, unusual tiredness, and dark colored urine. What is ezetimibe? Ezetimibe reduces the amount of cholesterol absorbed by the body. Ezetimibe is used to treat high cholesterol. Ezetimibe is sometimes given with other cholesterol-lowering medications. Ezetimibe may also be used for purposes not listed in this medication guide. What should I discuss with my healthcare provider before taking ezetimibe? You should not use ezetimibe if you are allergic to it, or if you have:  · moderate to severe liver disease. You should not use ezetimibe with a \"statin\" cholesterol medicine (Zocor, Lipitor, Crestor, and others) if:   · you have active liver disease;  · you are pregnant; or  · you are breast-feeding a baby. To make sure ezetimibe is safe for you, tell your doctor if you have:  · kidney disease; or  · a thyroid disorder. Before you start taking ezetimibe, tell your doctor if you already take a statin cholesterol medicine. Some cholesterol medications can cause a condition that results in the breakdown of skeletal muscle tissue, leading to kidney failure. This condition may be more likely to occur in older adults and in people who have kidney disease or poorly controlled hypothyroidism (underactive thyroid). You should not take ezetimibe with a statin medication if you are pregnant or breast-feeding. It is not known whether ezetimibe alone will harm an unborn baby. Tell your doctor if you are pregnant or plan to become pregnant. Use effective birth control to prevent pregnancy while you are using ezetimibe with a statin medication.   It is not known whether ezetimibe alone passes into breast milk or if it could harm a cholesterol if you do not follow a cholesterol-lowering diet plan. What are the possible side effects of ezetimibe? Get emergency medical help if you have signs of an allergic reaction: hives; difficult breathing; swelling of your face, lips, tongue, or throat. Some cholesterol medications can cause a condition that results in the breakdown of skeletal muscle tissue, leading to kidney failure. Call your doctor right away if you have unexplained muscle pain, tenderness, or weakness especially if you also have fever, unusual tiredness, and dark colored urine. Side effects may be more likely in older adults. Common side effects may include:  · muscle or joint pain;  · stuffy nose, sinus pain, sore throat;  · diarrhea; or  · pain in an arm or leg. This is not a complete list of side effects and others may occur. Call your doctor for medical advice about side effects. You may report side effects to FDA at 4-574-FDA-9577. What other drugs will affect ezetimibe? Tell your doctor about all your current medicines and any you start or stop using, especially:  · cyclosporine; or  · a blood thinner (warfarin, Coumadin, Shabana Oden). This list is not complete. Other drugs may interact with ezetimibe, including prescription and over-the-counter medicines, vitamins, and herbal products. Not all possible interactions are listed in this medication guide. Where can I get more information? Your pharmacist can provide more information about ezetimibe. Remember, keep this and all other medicines out of the reach of children, never share your medicines with others, and use this medication only for the indication prescribed. Every effort has been made to ensure that the information provided by [address] is accurate, up-to-date, and complete, but no guarantee is made to that effect. Drug information contained herein may be time sensitive.  [employer] information has been compiled for use by healthcare practitioners and consumers in the [place] and therefore [employer] does not warrant that uses outside of the [place] are appropriate, unless specifically indicated otherwise. [facility]'s drug information does not endorse drugs, diagnose patients or recommend therapy. [facility] drug information is an informational resource designed to assist licensed healthcare practitioners in caring for their patients and/or to serve consumers viewing this service as a supplement to, and not a substitute for, the expertise, skill, knowledge and judgment of healthcare practitioners. The absence of a warning for a given drug or drug combination in no way should be construed to indicate that the drug or drug combination is safe, effective or appropriate for any given patient. [facility] does not assume any responsibility for any aspect of healthcare administered with the aid of information [facility] provides. The information contained herein is not intended to cover all possible uses, directions, precautions, warnings, drug interactions, allergic reactions, or adverse effects. If you have questions about the drugs you are taking, check with your doctor, nurse or pharmacist.  Copyright 5795-7512 [address]. Version: 5.01. Revision date: 11/25/2015. Care instructions adapted under license by [facility] ([facility]). If you have questions about a medical condition or this instruction, always ask your healthcare professional. [place] [address] any warranty or liability for your use of this information. Patient Education        evolocumab  Pronunciation:  ANGLE MELENDEZ ue mab  Brand:  Kathleen Graves Pushtronex, Edy [employer]  What is the most important information I should know about evolocumab? Follow all directions on your medicine label and package. Tell each of your healthcare providers about all your medical conditions, allergies, and all medicines you use. What is evolocumab?   Evolocumab works by helping the pharmacist if you have questions, or call the  at [phone]. The Pushtronex on-body infusor is a special device placed on the skin that delivers your evolocumab dose slowly. You will need to wear the device for about 9 minutes to get the full dose. While wearing the on-body infusor, you may perform moderate activities such as walking, bending, or reaching. Your care provider will show you the best places on your body to inject evolocumab or place the on-body infusor. Use a different place each time you give an injection. Do not inject into the same place two times in a row. Each single-use prefilled syringe, cartridge, or injection device is for one use only. Throw away after one use, even if there is still some medicine left inside. Follow any state or local laws about throwing away used needles and syringes. Use a puncture-proof \"sharps\" container. Follow state or local laws about how to dispose of this container. Keep it out of the reach of children and pets. Store evolocumab in the refrigerator in its original carton and protect from light and heat. Do not freeze. Throw away any evolocumab that has been frozen. Take the medicine out of the refrigerator and let it reach room temperature for 30 to 45 minutes before injecting your dose. Do not heat a syringe or injection device. You may also store evolocumab in the original carton at cool room temperature, away from light and heat. Use the medicine within 30 days if it is kept at room temperature. Handle this medicine carefully. Dropping an injection device can cause it to break. Do not use an injection device that has been dropped onto a hard surface, even if you cannot see a break in it. Call your pharmacist for new medicine. Do not shake this medicine. Do not use if the medicine has changed colors or has particles in it. Call your pharmacist for new medicine.   You should not stop using evolocumab without your doctor's advice, or your LDL cholesterol levels may increase. Evolocumab is only part of a complete treatment program that also includes diet, statin medication, and regular blood testing. Follow your doctor's instructions very closely. What happens if I miss a dose? Use the missed dose within 7 days after that injection was due, but skip the missed dose if you are more than 7 days late. After a missed dose, go back to your original schedule and use the medicine again when your next scheduled dose is due. Do not use two doses at one time. What happens if I overdose? Seek emergency medical attention or call the Poison Help line at [phone]. What should I avoid while using evolocumab? Do not inject evolocumab into skin that is bruised, sore, scarred, or hardened. What are the possible side effects of evolocumab? Get emergency medical help if you have signs of an allergic reaction: hives, severe itching; difficult breathing; swelling of your face, lips, tongue, or throat. Call your doctor at once if you have:  · high blood sugar --increased thirst, increased urination, dry mouth, fruity breath odor. Common side effects may include:  · redness, pain, or bruising where an injection was given;  · back pain;  · flu symptoms; or  · cold symptoms such as stuffy nose, sneezing, sore throat. This is not a complete list of side effects and others may occur. Call your doctor for medical advice about side effects. You may report side effects to FDA at 7-563-VRH-6856. What other drugs will affect evolocumab? Other drugs may affect evolocumab, including prescription and over-the-counter medicines, vitamins, and herbal products. Tell your doctor about all your current medicines and any medicine you start or stop using. Where can I get more information? Your pharmacist can provide more information about evolocumab.   Remember, keep this and all other medicines out of the reach of children, never share your medicines with others, and use this medication only for the indication prescribed. Every effort has been made to ensure that the information provided by [address] Kumar [address] is accurate, up-to-date, and complete, but no guarantee is made to that effect. Drug information contained herein may be time sensitive. [facility] information has been compiled for use by healthcare practitioners and consumers in the [place] and therefore [facility] does not warrant that uses outside of the [place] are appropriate, unless specifically indicated otherwise. [facility]'s drug information does not endorse drugs, diagnose patients or recommend therapy. [facility]'s drug information is an informational resource designed to assist licensed healthcare practitioners in caring for their patients and/or to serve consumers viewing this service as a supplement to, and not a substitute for, the expertise, skill, knowledge and judgment of healthcare practitioners. The absence of a warning for a given drug or drug combination in no way should be construed to indicate that the drug or drug combination is safe, effective or appropriate for any given patient. [facility] does not assume any responsibility for any aspect of healthcare administered with the aid of information [facility] provides. The information contained herein is not intended to cover all possible uses, directions, precautions, warnings, drug interactions, allergic reactions, or adverse effects. If you have questions about the drugs you are taking, check with your doctor, nurse or pharmacist.  Copyright 6049-0374 [address]: 2.02. Revision date: 11/7/2018. Care instructions adapted under license by [facility] ([facility]). If you have questions about a medical condition or this instruction, always ask your healthcare professional. [place] [address] any warranty or liability for your use of this information.

## 2020-10-05 NOTE — PROGRESS NOTES
coronary artery stent placement Z95.5    Coronary artery disease involving native coronary artery of native heart without angina pectoris I25.10    Mixed hyperlipidemia E78.2     Past Medical History:   Diagnosis Date    DJD (degenerative joint disease)     knees    Hypertension      Past Surgical History:   Procedure Laterality Date    CARPAL TUNNEL RELEASE      right arm     SECTION      CORONARY ANGIOPLASTY   and     HAND TENDON SURGERY      right    HYSTERECTOMY       Family History   Problem Relation Age of Onset    Heart Failure Mother          heart attack    Stroke Mother     Hypertension Mother     Cancer Other         Uncles    Hypertension Other         paternal side as well     Social History     Tobacco Use    Smoking status: Former Smoker     Packs/day: 1.00     Types: Cigarettes     Last attempt to quit: 2014     Years since quittin.7    Smokeless tobacco: Never Used   Substance Use Topics    Alcohol use: No      Current Outpatient Medications   Medication Sig Dispense Refill    clopidogrel (PLAVIX) 75 MG tablet Take 1 tablet by mouth daily 30 tablet 3    carvedilol (COREG) 12.5 MG tablet Take 1 tablet by mouth 2 times daily (with meals) 60 tablet 3    atorvastatin (LIPITOR) 40 MG tablet Take 1 tablet by mouth nightly 30 tablet 3    amLODIPine (NORVASC) 5 MG tablet Take 1 tablet by mouth 2 times daily 30 tablet 3    aspirin 81 MG chewable tablet Take 1 tablet by mouth daily 30 tablet 3    losartan (COZAAR) 100 MG tablet Take 1 tablet by mouth daily 30 tablet 5    isosorbide mononitrate (IMDUR) 30 MG extended release tablet Take 1 tablet by mouth daily 30 tablet 3    nitroGLYCERIN (NITROSTAT) 0.4 MG SL tablet up to max of 3 total doses.  If no relief after 1 dose, call 911. 25 tablet 3    furosemide (LASIX) 20 MG tablet Take 1 tablet by mouth daily 30 tablet 5    omeprazole (PRILOSEC) 40 MG delayed release capsule TAKE ONE CAPSULE BY MOUTH ONCE DAILY 30 capsule 5     No current facility-administered medications for this visit. Allergies: Ace inhibitors and Augmentin [amoxicillin-pot clavulanate]    Review of Systems  Constitutional - no appetite change, or unexpected weight change. No fever, chills or diaphoresis. No significant change in activity level or new onset of fatigue. HEENT - no significant rhinorrhea or epistaxis. No tinnitus or significant hearing loss. Eyes - no sudden vision change or amaurosis. No corneal arcus, xantholasma, subconjunctival hemorrhage or discharge. Respiratory - no significant wheezing, stridor, apnea or cough. No dyspnea on exertion or shortness of air. Cardiovascular - no exertional chest pain to suggest myocardial ischemia. No orthopnea or PND. No sensation of sustained arrythmia. No occurrence of slow heart rate. No palpitations. No claudication. Gastrointestinal - no abdominal swelling or pain. No blood in stool. No severe constipation, diarrhea, nausea, or vomiting. Genitourinary - no dysuria, frequency, or urgency. No flank pain or hematuria. Musculoskeletal - no back pain. No problems with gait. + myalgia when on Lipitor. Extremities - no clubbing, cyanosis or extremity edema. Skin - no color change or rash. No pallor. No new surgical incision. Neurologic - no speech difficulty, facial asymmetry or lateralizing weakness. No seizures, presyncope or syncope. No significant dizziness. Hematologic - no easy bruising or excessive bleeding. Psychiatric - no severe anxiety or insomnia. No confusion. All other review of systems are negative. Objective  Vital Signs - /80   Pulse 80   Ht 5' 5\" (1.651 m)   Wt 195 lb (88.5 kg)   BMI 32.45 kg/m²   General - Mary Dang is alert, cooperative, and pleasant. Well groomed. No acute distress. Body habitus - Body mass index is 32.45 kg/m². HEENT - Head is normocephalic. No circumoral cyanosis.   Dentition is normal.  EYES -   Lids normal without ptosis. No discharge, edema or subconjunctival hemorrhage. Neck - Symmetrical without apparent mass or lymphadenopathy. Respiratory - Normal respiratory effort without use of accessory muscles. Ausculatation reveals vesicular breath sounds without crackles, wheezes, rub or rhonchi. Cardiovascular - No jugular venous distention. Auscultation reveals regular rate and rhythm. No audible clicks, gallop or rub. No murmur. No lower extremity varicosities. No carotid bruits. Abdominal -  No visible distention, mass or pulsations. Extremities - No clubbing or cyanosis. No statis dermatitis or ulcers. No edema. Musculoskeletal -   No Osler's nodes. No kyphosis or scoliosis. Gait is even and regular without limp or shuffle. Ambulates without assistance. Skin -  Warm and dry; no rash or pallor. No new surgical wound. Neurological - No focal neurological deficits. Thought processes coherent. No apparent tremor. Oriented to person, place and time. Psychiatric -  Appropriate affect and mood. Assessment:     Diagnosis Orders   1. Coronary artery disease involving native coronary artery of native heart without angina pectoris     2. Essential hypertension     3. History of coronary artery stent placement      9/25/20 Successful PCI to proximal to mid LAD (2.75 x 15 mm resolute integrity   taken to 3.25 mm proximally) utilizing drug-eluting stent. 4. Mixed hyperlipidemia       Data reviewed:  9/25/20 cath  Conclusions    Single-vessel disease with obstructive proximal to mid circumflex stenosis  with intermediate ostial LAD stenosis. Calcific coronary disease. Normal LV ejection fraction. Successful PCI to proximal to mid LAD (2.75 x 15 mm resolute integrity  taken to 3.25 mm proximally) utilizing drug-eluting stent. Recommendations    Medical management. Smoking cessation.     Signatures    ---------------------------------------------------------------- Electronically signed by Dave Garcia MD(Performing Physician) on  09/27/2020 14:46   ----------------------------------------------------------------    Lab Results   Component Value Date    WBC 8.6 09/28/2020    HGB 13.2 09/28/2020    HCT 39.5 09/28/2020    MCV 95.2 09/28/2020     09/28/2020     Lab Results   Component Value Date     09/28/2020    K 4.0 09/28/2020     09/28/2020    CO2 21 (L) 09/28/2020    BUN 12 09/28/2020    CREATININE 0.5 09/28/2020    GLUCOSE 87 09/28/2020    CALCIUM 8.8 09/28/2020    PROT 6.4 (L) 09/25/2020    LABALBU 4.1 09/25/2020    BILITOT <0.2 09/25/2020    ALKPHOS 89 09/25/2020    AST 16 09/25/2020    ALT <5 (A) 09/25/2020    LABGLOM >60 09/28/2020    GFRAA >59 09/28/2020    AGRATIO 2.0 06/01/2016    GLOB 2.3 11/28/2016       Lab Results   Component Value Date    CHOL 188 09/25/2020    CHOL 206 (H) 05/24/2017    CHOL 209 (H) 11/28/2016     Lab Results   Component Value Date    TRIG 199 (H) 09/25/2020    TRIG 75 (L) 05/24/2017    TRIG 133 (L) 11/28/2016     Lab Results   Component Value Date    HDL 42 (L) 09/25/2020    HDL 53 (L) 05/24/2017    HDL 45 (L) 11/28/2016     Lab Results   Component Value Date    LDLCALC 106 09/25/2020    [address] 138 05/24/2017    LDLCALC 137 11/28/2016     Lab Results   Component Value Date    LABVLDL 20 06/01/2016       Stable CV status without overt heart failure, sensed arrhythmia or angina. CAD - stable on current medical management. HTN - normotensive on current regimen. Hyperlipidemia - . Patient stopped taking Lipitor due to muscle pain. Reports issues with statin therapy in the past.  Discussed option of Zetia or Repatha. Patient would like to discuss with her PCP. Patient is compliant with medication regimen.     BP Readings from Last 3 Encounters:   10/05/20 138/80   09/27/20 (!) 170/95   05/24/17 138/72    Pulse Readings from Last 3 Encounters:   10/05/20 80   09/27/20 76   03/15/17 81        Wt Readings from Last 3 Encounters:   10/05/20 195 lb (88.5 kg)   09/27/20 191 lb 6 oz (86.8 kg)   05/24/17 219 lb (99.3 kg)     Plan  Previous cardiac history and records reviewed. Continue current medical management. Advised patient to discuss option of Zetia or Repatha with her PCP. Continue other current medications as directed. Continue to follow up with primary care provider for non cardiac medical problems. Call the office with any problems, questions or concerns at [phone]. Cardiology follow up: 2 months. Educational included in patient instructions. Heart health.      Edgardo Kenyon, APRN

## 2021-04-06 ENCOUNTER — OFFICE VISIT (OUTPATIENT)
Dept: CARDIOLOGY CLINIC | Age: 66
End: 2021-04-06
Payer: MEDICARE

## 2021-04-06 VITALS
BODY MASS INDEX: 34.32 KG/M2 | HEIGHT: 65 IN | DIASTOLIC BLOOD PRESSURE: 84 MMHG | SYSTOLIC BLOOD PRESSURE: 158 MMHG | HEART RATE: 88 BPM | WEIGHT: 206 LBS

## 2021-04-06 DIAGNOSIS — I10 ESSENTIAL HYPERTENSION: ICD-10-CM

## 2021-04-06 DIAGNOSIS — Z95.5 HISTORY OF CORONARY ARTERY STENT PLACEMENT: ICD-10-CM

## 2021-04-06 DIAGNOSIS — E78.2 MIXED HYPERLIPIDEMIA: ICD-10-CM

## 2021-04-06 DIAGNOSIS — I25.10 CORONARY ARTERY DISEASE INVOLVING NATIVE CORONARY ARTERY OF NATIVE HEART WITHOUT ANGINA PECTORIS: Primary | ICD-10-CM

## 2021-04-06 PROCEDURE — 99214 OFFICE O/P EST MOD 30 MIN: CPT | Performed by: NURSE PRACTITIONER

## 2021-04-06 RX ORDER — CLONIDINE HYDROCHLORIDE 0.2 MG/1
TABLET ORAL NIGHTLY
COMMUNITY
Start: 2021-03-31

## 2021-04-06 RX ORDER — NEBIVOLOL 5 MG/1
5 TABLET ORAL NIGHTLY
Qty: 30 TABLET | Refills: 5 | Status: SHIPPED | OUTPATIENT
Start: 2021-04-06 | End: 2021-04-20

## 2021-04-06 NOTE — PROGRESS NOTES
[facility], [place]. [address] [facility] [address] [facility]  [phone] office  [phone] fax      OFFICE VISIT:  2021    Boyd Quarles - : 1955  Reason For Visit:  Deanne Barnett is a 63 y.o. female who is here for Coronary Artery Disease (swelling )    History:   Diagnosis Orders   1. Coronary artery disease involving native coronary artery of native heart without angina pectoris     2. Essential hypertension     3. Mixed hyperlipidemia     4. History of coronary artery stent placement      20 Successful PCI to proximal to mid LAD (2.75 x 15 mm resolute integrity   taken to 3.25 mm proximally) utilizing drug-eluting stent - Dr. Mary Howard     The patient presents today for cardiology follow up. The patient reports no incident of angina. She states \"I have felt a little more SOA since getting the vaccine. \"  The patient reports that her PCP stopped amlodipine on 3/31/21 due to leg edema. She also started lasix 40 mg daily and is better as far as lower extremity edema. The patient had cath on 20 showing single vessel disease with proximal to mid circumflex stenosis with intermediate ostial LAD stenosis. Subsequently, patient had PCI to proximal to mid LAD utilizing RIN. The patient denies symptoms to suggest myocardial ischemia, heart failure or arrhythmia. BP is well controlled on current regimen. The patient's PCP monitors cholesterol. Subjective  Deanne Barnett denies exertional chest pain, shortness of breath, orthopnea, paroxysmal nocturnal dyspnea, syncope, presyncope, sensed arrhythmia and edema. The patient denies numbness or weakness to suggest cerebrovascular accident or transient ischemic attack. + fatigue.   Boyd Quarles has the following history as recorded in [facility]:    Patient Active Problem List   Diagnosis Code    Generalized osteoarthrosis, involving multiple sites M15.9    Edema R60.9    Elevated cholesterol E78.00 COPD (chronic obstructive pulmonary disease)    HTN (hypertension)    Hyperthyroidism  Vitamin D deficiency E55.9    HTN (hypertension) I10    Osteoporosis M81.0    Adrenal nodule (HCC) E27.8    Obesity (BMI 30-39. 9) E66.9    History of coronary artery stent placement Z95.5    Coronary artery disease involving native coronary artery of native heart without angina pectoris I25.10    Mixed hyperlipidemia E78.2     Past Medical History:   Diagnosis Date    DJD (degenerative joint disease)     knees    Hypertension      Past Surgical History:   Procedure Laterality Date    CARPAL TUNNEL RELEASE      right arm     SECTION      CORONARY ANGIOPLASTY   and     HAND TENDON SURGERY      right    HYSTERECTOMY       Family History   Problem Relation Age of Onset    Heart Failure Mother          heart attack    Stroke Mother     Hypertension Mother     Cancer Other         Uncles    Hypertension Other         paternal side as well     Social History     Tobacco Use    Smoking status: Former Smoker     Packs/day: 1.00     Types: Cigarettes     Quit date: 2014     Years since quittin.2    Smokeless tobacco: Never Used   Substance Use Topics    Alcohol use: No      Current Outpatient Medications   Medication Sig Dispense Refill    cloNIDine (CATAPRES) 0.2 MG tablet Take by mouth      clopidogrel (PLAVIX) 75 MG tablet Take 1 tablet by mouth daily 30 tablet 3    carvedilol (COREG) 12.5 MG tablet Take 1 tablet by mouth 2 times daily (with meals) 60 tablet 3    aspirin 81 MG chewable tablet Take 1 tablet by mouth daily 30 tablet 3    losartan (COZAAR) 100 MG tablet Take 1 tablet by mouth daily 30 tablet 5    isosorbide mononitrate (IMDUR) 30 MG extended release tablet Take 1 tablet by mouth daily 30 tablet 3    nitroGLYCERIN (NITROSTAT) 0.4 MG SL tablet up to max of 3 total doses.  If no relief after 1 dose, call 911. 25 tablet 3    furosemide (LASIX) 20 MG tablet Take 1 tablet by mouth daily (Patient taking differently: Take 40 mg by mouth daily ) 30 tablet 5    omeprazole (PRILOSEC) 40 MG delayed release capsule TAKE ONE CAPSULE BY MOUTH ONCE DAILY 30 capsule 5    amLODIPine (NORVASC) 5 MG tablet Take 1 tablet by mouth 2 times daily (Patient not taking: Reported on 4/6/2021) 30 tablet 3     No current facility-administered medications for this visit. Allergies: Ace inhibitors and Augmentin [amoxicillin-pot clavulanate]    Review of Systems  Constitutional  no appetite change, or unexpected weight change. No fever, chills or diaphoresis. + fatigue. HEENT  no significant rhinorrhea or epistaxis. No tinnitus or significant hearing loss. Eyes  no sudden vision change or amaurosis. No corneal arcus, xantholasma, subconjunctival hemorrhage or discharge. Respiratory  no significant wheezing, stridor, apnea or cough. No dyspnea on exertion or shortness of air. Cardiovascular  no exertional chest pain to suggest myocardial ischemia. No orthopnea or PND. No sensation of sustained arrythmia. No occurrence of slow heart rate. No palpitations. No claudication. Gastrointestinal  no abdominal swelling or pain. No blood in stool. No severe constipation, diarrhea, nausea, or vomiting. Genitourinary  no dysuria, frequency, or urgency. No flank pain or hematuria. Musculoskeletal  no back pain or myalgia. No problems with gait. Extremities - no clubbing, cyanosis or extremity edema. Skin  no color change or rash. No pallor. No new surgical incision. Neurologic  no speech difficulty, facial asymmetry or lateralizing weakness. No seizures, presyncope or syncope. No significant dizziness. Hematologic  no easy bruising or excessive bleeding. Psychiatric  no severe anxiety or insomnia. No confusion. All other review of systems are negative. Objective  Vital Signs - BP (!) 150/90   Pulse 88   Ht 5' 5\" (1.651 m)   Wt 206 lb (93.4 kg)   BMI 34.28 kg/m²   General - Sherell Hammans is alert, cooperative, and pleasant. Well groomed.   No acute distress. Body habitus - Body mass index is 34.28 kg/m². HEENT  Head is normocephalic. No circumoral cyanosis. Dentition is normal.  EYES -   Lids normal without ptosis. No discharge, edema or subconjunctival hemorrhage. Neck - Symmetrical without apparent mass or lymphadenopathy. Respiratory - Normal respiratory effort without use of accessory muscles. Ausculatation reveals vesicular breath sounds without crackles, wheezes, rub or rhonchi. Cardiovascular  No jugular venous distention. Auscultation reveals regular rate and rhythm. No audible clicks, gallop or rub. No murmur. No lower extremity varicosities. No carotid bruits. Abdominal -  No visible distention, mass or pulsations. Extremities - No clubbing or cyanosis. No statis dermatitis or ulcers. No edema. Musculoskeletal -   No Osler's nodes. No kyphosis or scoliosis. Gait is even and regular without limp or shuffle. Ambulates without assistance. Skin -  Warm and dry; no rash or pallor. No new surgical wound. Neurological - No focal neurological deficits. Thought processes coherent. No apparent tremor. Oriented to person, place and time. Psychiatric -  Appropriate affect and mood. Data reviewed:  9/25/20 cath  Single-vessel disease with obstructive proximal to mid circumflex stenosis   with intermediate ostial LAD stenosis. Calcific coronary disease. Normal LV ejection fraction. Successful PCI to proximal to mid LAD (2.75 x 15 mm resolute integrity   taken to 3.25 mm proximally) utilizing drug-eluting stent. Recommendations    Medical management. Smoking cessation. Signatures    Electronically signed by Angelito Garcia MD(Performing Physician) on   09/27/2020 14:46    9/26/20 echo  LV is normal in size with normal systolic function. Grade 1 diastolic dysfunction. RV is normal in size with normal systolic function. Moderate left atrial enlargement. Normal right atrial size.    Mitral valve is structurally normal with normal leaflet mobility. No   stenosis. Trace mitral regurgitation. Aortic valve is trileaflet with normal leaflet mobility. No stenosis or   regurgitation. Trace tricuspid regurgitation. No significant pericardial effusion. Signature   Electronically signed by Gabe Garcia MD(Interpreting physician)   on 09/26/2020 01:31 PM    Lab Results   Component Value Date    WBC 8.6 09/28/2020    HGB 13.2 09/28/2020    HCT 39.5 09/28/2020    MCV 95.2 09/28/2020     09/28/2020     Lab Results   Component Value Date     09/28/2020    K 4.0 09/28/2020     09/28/2020    CO2 21 (L) 09/28/2020    BUN 12 09/28/2020    CREATININE 0.5 09/28/2020    GLUCOSE 87 09/28/2020    CALCIUM 8.8 09/28/2020    PROT 6.4 (L) 09/25/2020    LABALBU 4.1 09/25/2020    BILITOT <0.2 09/25/2020    ALKPHOS 89 09/25/2020    AST 16 09/25/2020    ALT <5 (A) 09/25/2020    LABGLOM >60 09/28/2020    GFRAA >59 09/28/2020    AGRATIO 2.0 06/01/2016    GLOB 2.3 11/28/2016       Lab Results   Component Value Date    CHOL 188 09/25/2020    CHOL 206 (H) 05/24/2017    CHOL 209 (H) 11/28/2016     Lab Results   Component Value Date    TRIG 199 (H) 09/25/2020    TRIG 75 (L) 05/24/2017    TRIG 133 (L) 11/28/2016     Lab Results   Component Value Date    HDL 42 (L) 09/25/2020    HDL 53 (L) 05/24/2017    HDL 45 (L) 11/28/2016     Lab Results   Component Value Date    LDLCALC 106 09/25/2020    LDLCALC 138 05/24/2017    LDLCALC 137 11/28/2016     Lab Results   Component Value Date    LABVLDL 20 06/01/2016       BP Readings from Last 3 Encounters:   04/06/21 (!) 150/90   10/05/20 138/80   09/27/20 (!) 170/95    Pulse Readings from Last 3 Encounters:   04/06/21 88   10/05/20 80   09/27/20 76        Wt Readings from Last 3 Encounters:   04/06/21 206 lb (93.4 kg)   10/05/20 195 lb (88.5 kg)   09/27/20 191 lb 6 oz (86.8 kg)     Assessment/Plan:   Diagnosis Orders   1.  Coronary artery disease involving native coronary artery of native heart without angina pectoris     2. Essential hypertension     3. Mixed hyperlipidemia     4. History of coronary artery stent placement      9/25/20 Successful PCI to proximal to mid LAD (2.75 x 15 mm resolute integrity   taken to 3.25 mm proximally) utilizing drug-eluting stent - Dr. Mei Zamudio     Stable CV status without overt heart failure, sensed arrhythmia or angina. CAD - stable on current medical management. DAPT through 9/27/21. HTN - BP elevated. Usually well controlled. BP log for 2 weeks with call back. Fatigue on Coreg reported. Change to Bystolic 5 mg daily. Hyperlipidemia - . Patient not taking statin therapy. Patient is compliant with medication regimen. Previous cardiac history and records reviewed. Continue current medical management for cardiac related condition. Continue other current medications as directed. Continue to follow up with primary care provider for non cardiac medical problems. If your [place] care provider [place] outside of [place] [facility] please request that your labs be faxed to this office at [phone]. BP goal 130/80 or less. Call the office with any problems, questions or concerns at [phone]. Cardiology follow up as scheduled in [address] appointments. Educational included in patient instructions. Heart health.       Patti Levy, RAFAT

## 2021-04-06 NOTE — PATIENT INSTRUCTIONS
New instructions for today:  Stop carvedilol. Substitute with Bystolic 5 mg (1) tab at bedtime. You will need to stay on Plavix and aspirin for one year after stent placed - through 9/27/21. After that date, you can stop Plavix and continue low dose enteric coated aspirin once daily. Do not schedule elective surgical procedures or invasive until after 9/27/21. Monitor your blood pressure twice daily and keep a log. Your blood pressure goal is 130/80 or less. We will discuss in 2 weeks by either scheduled telephone encounter or patient call back. Office phone number [phone]. Patient Instructions:  Continue current medications as prescribed. Always keep a current medication list. Bring your medications to every office visit. Continue to follow up with primary care provider for non cardiac medical problems. Call the office with any problems, questions or concerns at [phone]. If you have been asked to keep a blood pressure log, do so for 2 weeks. Call the office to report readings to the triage nurse at [phone]. Follow up with cardiologist as scheduled. The following educational material has been included in this after visit summary for your review: Life simple 7. Heart health. Life simple 7  1) Manage blood pressure - high blood pressure is a major risk factor for heart disease and stroke. Keeping blood pressure in health range reduces strain on your heart, arteries and kidneys. Blood pressure goal is less than 130/80. 2) Control cholesterol - contributes to plaque, which can clog arteries and lead to heart disease and stroke. When you control your cholesterol you are giving your arteries their best chance to remain clear. It is recommended that you get cholesterol lab work done once a year. 3) Reduce blood sugar - most of the food we eat is turning into glucose or blood sugar that our body uses for energy.   Over time, high levels of blood sugar can damage your heart, kidneys, eyes and nerves. 4) Get active - living an active life is one of the most rewarding gifts you can give yourself and those you love. Simply put, daily physical activity increases your length and quality of life. Strive to exercise 15 minutes most days of the week. 5)  Eat better - A healthy diet is one of your best weapons for fighting cardiovascular disease. When you eat a heart healthy diet, you improve your chances for feeling good and staying healthy for life. 6)  Lose weight - when you shed extra fat an unnecessary pounds, you reduce the burden on your hear, lungs, blood vessels and skeleton. You give yourself the gift of active living, you lower your blood pressure and help yourself feel better. 7) Stop smoking - cigarette smokers have a higher risk of developing cardiovascular disease. If  You smoke, quitting is the best thing you can do for your health. Check American Heart Association on line for more information on Life's Simple 7 and tips for healthy living. A Healthy Heart: Care Instructions  Your Care Instructions     Coronary artery disease, also called heart disease, occurs when a substance called plaque builds up in the vessels that supply oxygen-rich blood to your heart muscle. This can narrow the blood vessels and reduce blood flow. A heart attack happens when blood flow is completely blocked. A high-fat diet, smoking, and other factors increase the risk of heart disease. Your doctor has found that you have a chance of having heart disease. You can do lots of things to keep your heart healthy. It may not be easy, but you can change your diet, exercise more, and quit smoking. These steps really work to lower your chance of heart disease. Follow-up care is a key part of your treatment and safety. Be sure to make and go to all appointments, and call your doctor if you are having problems. It's also a good idea to know your test results and keep a list of the medicines you take.

## 2021-04-07 RX ORDER — ATENOLOL 25 MG/1
25 TABLET ORAL DAILY
Qty: 30 TABLET | Refills: 5 | Status: SHIPPED | OUTPATIENT
Start: 2021-04-07 | End: 2021-04-08 | Stop reason: SINTOL

## 2021-04-08 ENCOUNTER — TELEPHONE (OUTPATIENT)
Dept: CARDIOLOGY CLINIC | Age: 66
End: 2021-04-08

## 2021-04-08 RX ORDER — DILTIAZEM HYDROCHLORIDE 120 MG/1
120 CAPSULE, COATED, EXTENDED RELEASE ORAL DAILY
Qty: 30 CAPSULE | Refills: 2 | Status: SHIPPED | OUTPATIENT
Start: 2021-04-08

## 2021-04-08 NOTE — TELEPHONE ENCOUNTER
Hector Don- patient states the atenolol is doing the same thing as carvedilol. She said she can't breath with it. She uses [facility].

## 2021-04-20 ENCOUNTER — VIRTUAL VISIT (OUTPATIENT)
Dept: CARDIOLOGY CLINIC | Age: 66
End: 2021-04-20
Payer: MEDICARE

## 2021-04-20 DIAGNOSIS — Z95.5 HISTORY OF CORONARY ARTERY STENT PLACEMENT: ICD-10-CM

## 2021-04-20 DIAGNOSIS — E78.2 MIXED HYPERLIPIDEMIA: ICD-10-CM

## 2021-04-20 DIAGNOSIS — I10 ESSENTIAL HYPERTENSION: ICD-10-CM

## 2021-04-20 DIAGNOSIS — I25.10 CORONARY ARTERY DISEASE INVOLVING NATIVE CORONARY ARTERY OF NATIVE HEART WITHOUT ANGINA PECTORIS: Primary | ICD-10-CM

## 2021-04-20 PROCEDURE — 99441 PR PHYS/QHP TELEPHONE EVALUATION 5-10 MIN: CPT | Performed by: NURSE PRACTITIONER

## 2021-04-20 RX ORDER — FUROSEMIDE 40 MG/1
40 TABLET ORAL DAILY
COMMUNITY

## 2021-04-20 NOTE — PROGRESS NOTES
Danielle Koo is a 61 y.o. female evaluated via telephone on 4/20/2021. Consent:  She and/or health care decision maker is aware that that she may receive a bill for this telephone service, depending on her insurance coverage, and has provided verbal consent to proceed: Yes    Documentation:  I communicated with the patient and/or health care decision maker about HTN. Details of this discussion including any medical advice provided: Heart health. I affirm this is a Patient Initiated Episode with an Established Patient who has not had a related appointment within my department in the past 7 days or scheduled within the next 24 hours. Total Time: minutes: 5-10 minutes    Note: not billable if this call serves to triage the patient into an appointment for the relevant concern    Yanetleesa Contrerasbill      4/20/2021    Audio Patient Encouter(During POKDE-55 public health emergency)  The telephone encourter was conducted with patient in their residence from [address] with RAFAT Tineo; assistance by Cl Marin MA.    HPI:  Danielle Koo   Diagnosis Orders   1. Coronary artery disease involving native coronary artery of native heart without angina pectoris     2. Essential hypertension     3. History of coronary artery stent placement     4. Mixed hyperlipidemia     128/82, 134/82  Heart rate 82-91    The patient presents today for follow up audio evaluation regarding uncontrolled HTN and lower extremity edema. BP was 158/84 at last visit. The patient felt she was having symptoms of SOA and swelling due to beta blocker. Her insurance would not cover Bystolic, so started Cardizem  mg daily. The patient reports SOA, ankle edema and energy level are much better. BP is elevated in AM upon awakening but normalizes within 2 hours. BP log 128/82, 134/82; heart rate 82-91 bpm.    The patient denies symptoms to suggest myocardial ischemia, heart failure or arrhythmia.     The patient's PCP monitors cholesterol. SUBJECTIVE:  Padilla Conley denies exertional chest pain, shortness of breath, orthopnea, paroxysmal nocturnal dyspnea, syncope, presyncope, sensed arrhythmia, edema and fatigue. The patient denies numbness or weakness to suggest cerebrovascular accident or transient ischemic attack. Review of Systems    Prior to Visit Medications    Medication Sig Taking? Authorizing Provider   furosemide (LASIX) 40 MG tablet Take 40 mg by mouth daily Yes Historical Provider, MD   dilTIAZem (CARDIZEM CD) 120 MG extended release capsule Take 1 capsule by mouth daily  Patient taking differently: Take 120 mg by mouth nightly  Yes RAFAT Avery   cloNIDine (CATAPRES) 0.2 MG tablet Take by mouth nightly  Yes Historical Provider, MD   clopidogrel (PLAVIX) 75 MG tablet Take 1 tablet by mouth daily Yes Efrain Bernal MD   aspirin 81 MG chewable tablet Take 1 tablet by mouth daily Yes Efrain Bernal MD   losartan (COZAAR) 100 MG tablet Take 1 tablet by mouth daily Yes Efrain Bernal MD   isosorbide mononitrate (IMDUR) 30 MG extended release tablet Take 1 tablet by mouth daily Yes Efrain Bernal MD   nitroGLYCERIN (NITROSTAT) 0.4 MG SL tablet up to max of 3 total doses. If no relief after 1 dose, call 911. Yes Efrain Bernal MD   omeprazole (PRILOSEC) 40 MG delayed release capsule TAKE ONE CAPSULE BY MOUTH ONCE DAILY Yes RAFAT Lee - CNP       Social History     Tobacco Use    Smoking status: Former Smoker     Packs/day: 1.00     Types: Cigarettes     Quit date: 2014     Years since quittin.3    Smokeless tobacco: Never Used   Substance Use Topics    Alcohol use: No    Drug use: No        REVIEW OF SYSTEMS:  Constitutional  no appetite change, or unexpected weight change. No fever, chills or diaphoresis. No significant change in activity level or new onset of fatigue. HEENT  no significant rhinorrhea or epistaxis. No tinnitus or significant hearing loss.    Eyes  no sudden vision change or amaurosis. No corneal arcus, xantholasma, subconjunctival hemorrhage or discharge. Respiratory  no significant wheezing, stridor, apnea or cough. No dyspnea on exertion or shortness of air. Cardiovascular  no exertional chest pain to suggest myocardial ischemia. No orthopnea or PND. No sensation of sustained arrythmia. No occurrence of slow heart rate. No palpitations. No claudication. Gastrointestinal  no abdominal swelling or pain. No blood in stool. No severe constipation, diarrhea, nausea, or vomiting. Genitourinary  no dysuria, frequency, or urgency. No flank pain or hematuria. Musculoskeletal  no back pain or myalgia. No problems with gait. Extremities - no clubbing, cyanosis or extremity edema. Skin  no color change or rash. No pallor. No new surgical incision. Neurologic  no speech difficulty, facial asymmetry or lateralizing weakness. No seizures, presyncope or syncope. No significant dizziness. Hematologic  no easy bruising or excessive bleeding. Psychiatric  no severe anxiety or insomnia. No confusion. All other review of systems are negative. DATA REVIEWED:  9/27/20 cath - Dr. Trixie Wallis disease with obstructive proximal to mid circumflex stenosis   with intermediate ostial LAD stenosis. Calcific coronary disease. Normal LV ejection fraction. Successful PCI to proximal to mid LAD (2.75 x 15 mm resolute integrity   taken to 3.25 mm proximally) utilizing drug-eluting stent. Recommendations    Medical management. Smoking cessation.    Signatures   Electronically signed by Robyn Garcia MD(Performing Physician) on   09/27/2020 14:46    Lab Results   Component Value Date    WBC 8.6 09/28/2020    HGB 13.2 09/28/2020    HCT 39.5 09/28/2020    MCV 95.2 09/28/2020     09/28/2020     Lab Results   Component Value Date     09/28/2020    K 4.0 09/28/2020     09/28/2020    CO2 21 (L) 09/28/2020    BUN 12 09/28/2020    CREATININE 0.5 09/28/2020    GLUCOSE 87 09/28/2020    CALCIUM 8.8 09/28/2020    PROT 6.4 (L) 09/25/2020    LABALBU 4.1 09/25/2020    BILITOT <0.2 09/25/2020    ALKPHOS 89 09/25/2020    AST 16 09/25/2020    ALT <5 (A) 09/25/2020    LABGLOM >60 09/28/2020    GFRAA >59 09/28/2020    AGRATIO 2.0 06/01/2016    GLOB 2.3 11/28/2016       Lab Results   Component Value Date    CHOL 188 09/25/2020    CHOL 206 (H) 05/24/2017    CHOL 209 (H) 11/28/2016     Lab Results   Component Value Date    TRIG 199 (H) 09/25/2020    TRIG 75 (L) 05/24/2017    TRIG 133 (L) 11/28/2016     Lab Results   Component Value Date    HDL 42 (L) 09/25/2020    HDL 53 (L) 05/24/2017    HDL 45 (L) 11/28/2016     Lab Results   Component Value Date    LDLCALC 106 09/25/2020    LDLCALC 138 05/24/2017    LDLCALC 137 11/28/2016     Lab Results   Component Value Date    LABVLDL 20 06/01/2016     ASSESSMENT/PlAN:   Diagnosis Orders   1. Coronary artery disease involving native coronary artery of native heart without angina pectoris     2. Essential hypertension     3. History of coronary artery stent placement     4. Mixed hyperlipidemia       Stable cardiac status with no overt heart failure, angina or sensed arrhythmia. CAD - stable on current medical management. HTN - normalized BP 2 hours after taking anti-hypertensive therapy. Continue BP log with follow up telephone visit in one month. Hyperlipidemia - followed by PCP. . Patient compliant with medication regimen. Continue current medical management for cardiac condition. Continue current medications as prescribed. BP goal is 130/80 or less. If your primary care provider is outside of the [facility], please request your labs be faxed to this office at [phone]. Continue to follow up with primary care provider for non cardiac medical problems. Call the office with any problems, questions or concerns at [phone]. Follow up with cardiologist as scheduled in [address]   One month phone visit.  The following educational material has been included in this after visit summary for patient review:  Heart health. Lorene Asencio is a 61 y.o. female being evaluated by a telephone encounter to address concerns as mentioned above. A caregiver was present when appropriate. Due to this being a TeleHealth encounter (During DAXSD-62 public health emergency). Pursuant to the emergency declaration under the [address] [place] [address] and the [employer] Act, this Virtual Visit was conducted with patient's (and/or legal guardian's) consent, to reduce the patient's risk of exposure to COVID-19 and provide necessary medical care. The patient (and/or legal guardian) has also been advised to contact this office for worsening conditions or problems, and seek emergency medical treatment and/or call 911 if deemed necessary. Services were provided through a telephone discussion virtually to substitute for in-person clinic visit. Patient and provider were located at their individual homes. --RAFAT Calvert on 4/20/2021 at 10:57 AM    An electronic signature was used to authenticate this note.

## 2021-04-20 NOTE — PATIENT INSTRUCTIONS
New instructions for today:  Continue same blood pressure medications. Monitor your blood pressure twice daily and keep a log. Your blood pressure goal is 130/80 or less. We will discuss in one month by either scheduled telephone encounter or patient call back. Office phone number [phone]. Patient Instructions:  Continue current medications as prescribed. Always keep a current medication list. Bring your medications to every office visit. Continue to follow up with primary care provider for non cardiac medical problems. Call the office with any problems, questions or concerns at [phone]. If you have been asked to keep a blood pressure log, do so for 2 weeks. Call the office to report readings to the triage nurse at [phone]. Follow up with cardiologist as scheduled. The following educational material has been included in this after visit summary for your review: Life simple 7. Heart health. Life simple 7  1) Manage blood pressure - high blood pressure is a major risk factor for heart disease and stroke. Keeping blood pressure in health range reduces strain on your heart, arteries and kidneys. Blood pressure goal is less than 130/80. 2) Control cholesterol - contributes to plaque, which can clog arteries and lead to heart disease and stroke. When you control your cholesterol you are giving your arteries their best chance to remain clear. It is recommended that you get cholesterol lab work done once a year. 3) Reduce blood sugar - most of the food we eat is turning into glucose or blood sugar that our body uses for energy. Over time, high levels of blood sugar can damage your heart, kidneys, eyes and nerves. 4) Get active - living an active life is one of the most rewarding gifts you can give yourself and those you love. Simply put, daily physical activity increases your length and quality of life. Strive to exercise 15 minutes most days of the week.   5)  Eat better - A healthy diet is one of your best weapons for fighting cardiovascular disease. When you eat a heart healthy diet, you improve your chances for feeling good and staying healthy for life. 6)  Lose weight - when you shed extra fat an unnecessary pounds, you reduce the burden on your hear, lungs, blood vessels and skeleton. You give yourself the gift of active living, you lower your blood pressure and help yourself feel better. 7) Stop smoking - cigarette smokers have a higher risk of developing cardiovascular disease. If  You smoke, quitting is the best thing you can do for your health. Check American Heart Association on line for more information on Life's Simple 7 and tips for healthy living. A Healthy Heart: Care Instructions  Your Care Instructions     Coronary artery disease, also called heart disease, occurs when a substance called plaque builds up in the vessels that supply oxygen-rich blood to your heart muscle. This can narrow the blood vessels and reduce blood flow. A heart attack happens when blood flow is completely blocked. A high-fat diet, smoking, and other factors increase the risk of heart disease. Your doctor has found that you have a chance of having heart disease. You can do lots of things to keep your heart healthy. It may not be easy, but you can change your diet, exercise more, and quit smoking. These steps really work to lower your chance of heart disease. Follow-up care is a key part of your treatment and safety. Be sure to make and go to all appointments, and call your doctor if you are having problems. It's also a good idea to know your test results and keep a list of the medicines you take. How can you care for yourself at home? Diet  · Use less salt when you cook and eat. This helps lower your blood pressure. Taste food before salting. Add only a little salt when you think you need it. With time, your taste buds will adjust to less salt.   · Eat fewer snack items, fast foods, canned soups, and other high-salt, high-fat, processed foods. · Read food labels and try to avoid saturated and trans fats. They increase your risk of heart disease by raising cholesterol levels. · Limit the amount of solid fat-butter, margarine, and shortening-you eat. Use olive, peanut, or canola oil when you cook. Bake, broil, and steam foods instead of frying them. · Eat a variety of fruit and vegetables every day. Dark green, deep orange, red, or yellow fruits and vegetables are especially good for you. Examples include spinach, carrots, peaches, and berries. · Foods high in fiber can reduce your cholesterol and provide important vitamins and minerals. High-fiber foods include whole-grain cereals and breads, oatmeal, beans, brown rice, citrus fruits, and apples. · Eat lean proteins. Heart-healthy proteins include seafood, lean meats and poultry, eggs, beans, peas, nuts, seeds, and soy products. · Limit drinks and foods with added sugar. These include candy, desserts, and soda pop. Lifestyle changes  · If your doctor recommends it, get more exercise. Walking is a good choice. Bit by bit, increase the amount you walk every day. Try for at least 30 minutes on most days of the week. You also may want to swim, bike, or do other activities. · Do not smoke. If you need help quitting, talk to your doctor about stop-smoking programs and medicines. These can increase your chances of quitting for good. Quitting smoking may be the most important step you can take to protect your heart. It is never too late to quit. · Limit alcohol to 2 drinks a day for men and 1 drink a day for women. Too much alcohol can cause health problems. · Manage other health problems such as diabetes, high blood pressure, and high cholesterol. If you think you may have a problem with alcohol or drug use, talk to your doctor. Medicines  · Take your medicines exactly as prescribed.  Call your doctor if you think you are having a problem with your medicine. · If your doctor recommends aspirin, take the amount directed each day. Make sure you take aspirin and not another kind of pain reliever, such as acetaminophen (Tylenol). When should you call for help? [id number] if you have symptoms of a heart attack. These may include:  · Chest pain or pressure, or a strange feeling in the chest.  · Sweating. · Shortness of breath. · Pain, pressure, or a strange feeling in the back, neck, jaw, or upper belly or in one or both shoulders or arms. · Lightheadedness or sudden weakness. · A fast or irregular heartbeat. After you call 911, the  may tell you to chew 1 adult-strength or 2 to 4 low-dose aspirin. Wait for an ambulance. Do not try to drive yourself. Watch closely for changes in your health, and be sure to contact your doctor if you have any problems. Where can you learn more? Go to https://A [employer] Night's [email or web address]. org and sign in to your [employer] account. Enter Q691 in the [employer] box to learn more about \"A Healthy Heart: Care Instructions. \"     If you do not have an account, please click on the \"Sign Up Now\" link. Current as of: December 16, 2019               Content Version: 12.5  © 9072-0298 Healthwise, Incorporated. Care instructions adapted under license by [facility] ([facility]). If you have questions about a medical condition or this instruction, always ask your healthcare professional. [place] [address] any warranty or liability for your use of this information.

## 2021-05-19 ENCOUNTER — TELEPHONE (OUTPATIENT)
Dept: CARDIOLOGY CLINIC | Age: 66
End: 2021-05-19

## 2021-05-19 NOTE — TELEPHONE ENCOUNTER
Called to rachelle peñaloza, left msg if needs to lalit to ret call. if doesnt need to rsd will see them at sched date & time.  rf 05/19

## 2023-07-26 ENCOUNTER — TRANSCRIBE ORDERS (OUTPATIENT)
Dept: ADMINISTRATIVE | Age: 68
End: 2023-07-26

## 2023-07-26 DIAGNOSIS — Z12.2 ENCOUNTER FOR SCREENING FOR LUNG CANCER: ICD-10-CM

## 2023-07-26 DIAGNOSIS — Z87.891 FORMER SMOKER: Primary | ICD-10-CM

## 2023-07-26 DIAGNOSIS — F17.210 SMOKING GREATER THAN 30 PACK YEARS: ICD-10-CM

## 2023-08-25 ENCOUNTER — HOSPITAL ENCOUNTER (OUTPATIENT)
Dept: CT IMAGING | Age: 68
Discharge: HOME | End: 2023-08-25
Payer: MEDICARE

## 2023-08-25 DIAGNOSIS — F17.210 SMOKING GREATER THAN 30 PACK YEARS: ICD-10-CM

## 2023-08-25 DIAGNOSIS — Z87.891 FORMER SMOKER: ICD-10-CM

## 2023-08-25 DIAGNOSIS — Z12.2 ENCOUNTER FOR SCREENING FOR LUNG CANCER: ICD-10-CM

## 2023-08-25 PROCEDURE — 71271 CT THORAX LUNG CANCER SCR C-: CPT

## 2024-06-12 ENCOUNTER — OFFICE VISIT (OUTPATIENT)
Dept: CARDIOLOGY CLINIC | Age: 69
End: 2024-06-12
Payer: MEDICARE

## 2024-06-12 VITALS
BODY MASS INDEX: 27.46 KG/M2 | DIASTOLIC BLOOD PRESSURE: 82 MMHG | SYSTOLIC BLOOD PRESSURE: 134 MMHG | WEIGHT: 165 LBS | HEART RATE: 77 BPM

## 2024-06-12 DIAGNOSIS — M54.9 BACK PAIN, UNSPECIFIED BACK LOCATION, UNSPECIFIED BACK PAIN LATERALITY, UNSPECIFIED CHRONICITY: ICD-10-CM

## 2024-06-12 DIAGNOSIS — R06.02 SHORTNESS OF BREATH: ICD-10-CM

## 2024-06-12 DIAGNOSIS — R00.2 POUNDING HEARTBEAT: ICD-10-CM

## 2024-06-12 DIAGNOSIS — Z82.49 FAMILY HISTORY OF EARLY CAD: ICD-10-CM

## 2024-06-12 DIAGNOSIS — I10 ESSENTIAL HYPERTENSION: ICD-10-CM

## 2024-06-12 DIAGNOSIS — I25.10 CORONARY ARTERY DISEASE INVOLVING NATIVE CORONARY ARTERY OF NATIVE HEART WITHOUT ANGINA PECTORIS: Primary | ICD-10-CM

## 2024-06-12 DIAGNOSIS — E78.5 HYPERLIPIDEMIA, UNSPECIFIED HYPERLIPIDEMIA TYPE: ICD-10-CM

## 2024-06-12 PROCEDURE — 93000 ELECTROCARDIOGRAM COMPLETE: CPT | Performed by: NURSE PRACTITIONER

## 2024-06-12 PROCEDURE — 3079F DIAST BP 80-89 MM HG: CPT | Performed by: NURSE PRACTITIONER

## 2024-06-12 PROCEDURE — 3075F SYST BP GE 130 - 139MM HG: CPT | Performed by: NURSE PRACTITIONER

## 2024-06-12 PROCEDURE — 1123F ACP DISCUSS/DSCN MKR DOCD: CPT | Performed by: NURSE PRACTITIONER

## 2024-06-12 PROCEDURE — 99204 OFFICE O/P NEW MOD 45 MIN: CPT | Performed by: NURSE PRACTITIONER

## 2024-06-12 RX ORDER — DILTIAZEM HYDROCHLORIDE 180 MG/1
180 CAPSULE, EXTENDED RELEASE ORAL DAILY
COMMUNITY

## 2024-06-12 RX ORDER — HYDROCODONE BITARTRATE AND ACETAMINOPHEN 5; 325 MG/1; MG/1
1 TABLET ORAL EVERY 6 HOURS PRN
COMMUNITY

## 2024-06-12 NOTE — PROGRESS NOTES
Insecurity: Not on file   Transportation Needs: Not on file   Physical Activity: Not on file   Stress: Not on file   Social Connections: Not on file   Intimate Partner Violence: Not on file   Housing Stability: Not on file       Allergies   Allergen Reactions    Atenolol Shortness Of Breath and Other (See Comments)     fatigue    Coreg [Carvedilol] Shortness Of Breath and Other (See Comments)     Fatigue     Ace Inhibitors      \"I really don't remember\" - [id number]    Amlodipine Swelling    Augmentin [Amoxicillin-Pot Clavulanate] Nausea Only         Current Outpatient Medications:     dilTIAZem (DILACOR XR) 180 MG extended release capsule, Take 1 capsule by mouth daily, Disp: , Rfl:     HYDROcodone-acetaminophen (NORCO) 5-325 MG per tablet, Take 1 tablet by mouth every 6 hours as needed for Pain. Max Daily Amount: 4 tablets, Disp: , Rfl:     furosemide (LASIX) 40 MG tablet, Take 1 tablet by mouth daily, Disp: , Rfl:     cloNIDine (CATAPRES) 0.2 MG tablet, Take by mouth nightly , Disp: , Rfl:     aspirin 81 MG chewable tablet, Take 1 tablet by mouth daily, Disp: 30 tablet, Rfl: 3    losartan (COZAAR) 100 MG tablet, Take 1 tablet by mouth daily, Disp: 30 tablet, Rfl: 5    isosorbide mononitrate (IMDUR) 30 MG extended release tablet, Take 1 tablet by mouth daily (Patient taking differently: Take 2 tablets by mouth daily), Disp: 30 tablet, Rfl: 3    nitroGLYCERIN (NITROSTAT) 0.4 MG SL tablet, up to max of 3 total doses. If no relief after 1 dose, call 911., Disp: 25 tablet, Rfl: 3    omeprazole (PRILOSEC) 40 MG delayed release capsule, TAKE ONE CAPSULE BY MOUTH ONCE DAILY, Disp: 30 capsule, Rfl: 5    PE:  Vitals:    06/12/24 0933   BP: 134/82   Pulse: 77       Estimated body mass index is 27.46 kg/m² as calculated from the following:    Height as of 4/6/21: 1.651 m (5' 5\").    Weight as of this encounter: 74.8 kg (165 lb).    Constitutional: She is oriented to person, place, and time. She appears well-developed and

## 2024-07-02 ENCOUNTER — HOSPITAL ENCOUNTER (OUTPATIENT)
Dept: CT IMAGING | Age: 69
Discharge: HOME | End: 2024-07-02
Payer: MEDICARE

## 2024-07-02 DIAGNOSIS — Z82.49 FAMILY HISTORY OF EARLY CAD: ICD-10-CM

## 2024-07-02 DIAGNOSIS — I25.10 CORONARY ARTERY DISEASE INVOLVING NATIVE CORONARY ARTERY OF NATIVE HEART WITHOUT ANGINA PECTORIS: ICD-10-CM

## 2024-07-02 PROCEDURE — 75571 CT HRT W/O DYE W/CA TEST: CPT

## 2024-07-09 ENCOUNTER — TELEPHONE (OUTPATIENT)
Dept: CARDIOLOGY CLINIC | Age: 69
End: 2024-07-09

## 2024-07-09 DIAGNOSIS — R00.2 POUNDING HEARTBEAT: ICD-10-CM

## 2024-07-09 DIAGNOSIS — R06.02 SOB (SHORTNESS OF BREATH): ICD-10-CM

## 2024-07-09 DIAGNOSIS — I25.10 CORONARY ARTERY DISEASE INVOLVING NATIVE CORONARY ARTERY OF NATIVE HEART WITHOUT ANGINA PECTORIS: Primary | ICD-10-CM

## 2024-07-09 DIAGNOSIS — R06.02 SHORTNESS OF BREATH: ICD-10-CM

## 2024-07-09 DIAGNOSIS — Z01.818 PRE-OP TESTING: Primary | ICD-10-CM

## 2024-07-09 NOTE — TELEPHONE ENCOUNTER
[place] at the [facility] [place] located on the first floor of [facility].   Enter through hospital main entrance and turn immediately to your left.    Spoke with: Aissatou Snowden    Date: Monday, July 22, 2024 @ 8 am  Pre-operative work-up:  CBC, CMP. Orders faxed to Prema Amaya's office on 7/9/24    Allergies:  Atenolol, Coreg [carvedilol], Ace inhibitors, Amlodipine, and Augmentin [amoxicillin-pot clavulanate]   Contact number:  [phone] (home)     Cardiac Catheterization Instructions   Do not eat or drink anything after midnight on the night before your procedure.  You can take your morning medications with a sip of water unless otherwise directed not to.    Bring a list of the names and dosages of all the medications you are taking.    You should arrange to have someone take you home rather than drive yourself.  Further plan will depend upon the result of the cardiac catheterization.      If for any reason you are unable to keep this appointment, please contact [facility], [phone], as soon as possible to reschedule.  -------------------------------------------------------------------------------------------------------------------  Cardiac Catheterization   (Coronary Angiography; Coronary Arteriography; Coronary Angiogram)   Definition:  Cardiac catheterization is a test that uses a catheter (tube) and x-ray machine to assess the heart and its blood supply.     Reasons for Procedure   It is used to find the cause of symptoms, like chest pain, that could mean heart problems.   Cardiac catheterization helps doctors to:   Identify narrowed or clogged arteries of the heart   Measure blood pressure within the heart   Evaluate how well the heart valves and chambers function   Check heart defects   Evaluate an enlarged heart   Decide on an appropriate treatment   Possible Complications   If you are planning to have a cardiac catheterization, your doctor will

## 2024-07-09 NOTE — TELEPHONE ENCOUNTER
----- Message from RAFAT Lopez sent at 7/8/2024  4:51 PM CDT -----  Please call let her know that CT calcium score showed buildup in the left main, LAD, circumflex, and right coronary artery.  The greatest percentage was in the RCA.  With her symptoms and these findings, let her know we will try to get her approved for cardiac catheterization.  If she is still agreeable, please get her scheduled and try to get prior authorization through insurance.

## 2024-07-17 ENCOUNTER — TELEPHONE (OUTPATIENT)
Dept: CARDIOLOGY CLINIC | Age: 69
End: 2024-07-17

## 2024-07-17 NOTE — TELEPHONE ENCOUNTER
Theresa with Emily Amaya's office called to verify what labs Dr. Garcia needed for heart cath. Advised CBC and BMP, patient is at office today. They will fax results attn: Aimee once resulted.

## 2024-07-19 ENCOUNTER — TELEPHONE (OUTPATIENT)
Dept: CARDIOLOGY CLINIC | Age: 69
End: 2024-07-19

## 2024-07-19 NOTE — TELEPHONE ENCOUNTER
Patient called to see if her heart cath for Monday had been approved with insurance. Advised per referral it shows as \"authorized\", she voiced understanding.

## 2024-07-22 ENCOUNTER — HOSPITAL ENCOUNTER (OUTPATIENT)
Age: 69
Setting detail: OUTPATIENT SURGERY
Discharge: HOME | End: 2024-07-22
Attending: INTERNAL MEDICINE | Admitting: INTERNAL MEDICINE
Payer: MEDICARE

## 2024-07-22 VITALS
SYSTOLIC BLOOD PRESSURE: 123 MMHG | OXYGEN SATURATION: 96 % | TEMPERATURE: 97.6 F | HEART RATE: 111 BPM | RESPIRATION RATE: 16 BRPM | HEIGHT: 65 IN | BODY MASS INDEX: 26.33 KG/M2 | DIASTOLIC BLOOD PRESSURE: 80 MMHG | WEIGHT: 158 LBS

## 2024-07-22 DIAGNOSIS — I25.10 CORONARY ATHEROSCLEROSIS OF NATIVE CORONARY ARTERY: ICD-10-CM

## 2024-07-22 DIAGNOSIS — R06.02 SOB (SHORTNESS OF BREATH): ICD-10-CM

## 2024-07-22 LAB
ANION GAP SERPL CALCULATED.3IONS-SCNC: 11 MMOL/L (ref 7–19)
BUN SERPL-MCNC: 15 MG/DL (ref 8–23)
CALCIUM SERPL-MCNC: 9.2 MG/DL (ref 8.8–10.2)
CHLORIDE SERPL-SCNC: 104 MMOL/L (ref 98–111)
CO2 SERPL-SCNC: 24 MMOL/L (ref 22–29)
CREAT SERPL-MCNC: 0.7 MG/DL (ref 0.5–0.9)
ECHO BSA: 1.81 M2
ERYTHROCYTE [DISTWIDTH] IN BLOOD BY AUTOMATED COUNT: 13.4 % (ref 11.5–14.5)
GLUCOSE SERPL-MCNC: 105 MG/DL (ref 74–109)
HCT VFR BLD AUTO: 43.9 % (ref 37–47)
HGB BLD-MCNC: 14.4 G/DL (ref 12–16)
MCH RBC QN AUTO: 30.4 PG (ref 27–31)
MCHC RBC AUTO-ENTMCNC: 32.8 G/DL (ref 33–37)
MCV RBC AUTO: 92.6 FL (ref 81–99)
PLATELET # BLD AUTO: 286 K/UL (ref 130–400)
PMV BLD AUTO: 10.2 FL (ref 9.4–12.3)
POTASSIUM SERPL-SCNC: 3.7 MMOL/L (ref 3.5–5)
RBC # BLD AUTO: 4.74 M/UL (ref 4.2–5.4)
SODIUM SERPL-SCNC: 139 MMOL/L (ref 136–145)
WBC # BLD AUTO: 13.3 K/UL (ref 4.8–10.8)

## 2024-07-22 PROCEDURE — 99152 MOD SED SAME PHYS/QHP 5/>YRS: CPT | Performed by: INTERNAL MEDICINE

## 2024-07-22 PROCEDURE — 2709999900 HC NON-CHARGEABLE SUPPLY: Performed by: INTERNAL MEDICINE

## 2024-07-22 PROCEDURE — C1887 CATHETER, GUIDING: HCPCS | Performed by: INTERNAL MEDICINE

## 2024-07-22 PROCEDURE — 7100000011 HC PHASE II RECOVERY - ADDTL 15 MIN: Performed by: INTERNAL MEDICINE

## 2024-07-22 PROCEDURE — 7100000010 HC PHASE II RECOVERY - FIRST 15 MIN: Performed by: INTERNAL MEDICINE

## 2024-07-22 PROCEDURE — 93458 L HRT ARTERY/VENTRICLE ANGIO: CPT | Performed by: INTERNAL MEDICINE

## 2024-07-22 PROCEDURE — C1769 GUIDE WIRE: HCPCS | Performed by: INTERNAL MEDICINE

## 2024-07-22 PROCEDURE — 2580000003 HC RX 258: Performed by: INTERNAL MEDICINE

## 2024-07-22 PROCEDURE — 93005 ELECTROCARDIOGRAM TRACING: CPT | Performed by: INTERNAL MEDICINE

## 2024-07-22 PROCEDURE — C9600 PERC DRUG-EL COR STENT SING: HCPCS | Performed by: INTERNAL MEDICINE

## 2024-07-22 PROCEDURE — 80048 BASIC METABOLIC PNL TOTAL CA: CPT

## 2024-07-22 PROCEDURE — 6360000002 HC RX W HCPCS: Performed by: INTERNAL MEDICINE

## 2024-07-22 PROCEDURE — C1894 INTRO/SHEATH, NON-LASER: HCPCS | Performed by: INTERNAL MEDICINE

## 2024-07-22 PROCEDURE — C1725 CATH, TRANSLUMIN NON-LASER: HCPCS | Performed by: INTERNAL MEDICINE

## 2024-07-22 PROCEDURE — 6370000000 HC RX 637 (ALT 250 FOR IP): Performed by: INTERNAL MEDICINE

## 2024-07-22 PROCEDURE — 85027 COMPLETE CBC AUTOMATED: CPT

## 2024-07-22 PROCEDURE — 2500000003 HC RX 250 WO HCPCS: Performed by: INTERNAL MEDICINE

## 2024-07-22 PROCEDURE — 99153 MOD SED SAME PHYS/QHP EA: CPT | Performed by: INTERNAL MEDICINE

## 2024-07-22 PROCEDURE — C1874 STENT, COATED/COV W/DEL SYS: HCPCS | Performed by: INTERNAL MEDICINE

## 2024-07-22 PROCEDURE — 92928 PRQ TCAT PLMT NTRAC ST 1 LES: CPT | Performed by: INTERNAL MEDICINE

## 2024-07-22 PROCEDURE — 6360000004 HC RX CONTRAST MEDICATION: Performed by: INTERNAL MEDICINE

## 2024-07-22 PROCEDURE — 36415 COLL VENOUS BLD VENIPUNCTURE: CPT

## 2024-07-22 DEVICE — STENT ONYXNG25012UX ONYX 2.50X12RX
Type: IMPLANTABLE DEVICE | Status: FUNCTIONAL
Brand: ONYX FRONTIER™

## 2024-07-22 RX ORDER — ACETAMINOPHEN 325 MG/1
650 TABLET ORAL EVERY 4 HOURS PRN
Status: DISCONTINUED | OUTPATIENT
Start: 2024-07-22 | End: 2024-07-22 | Stop reason: HOSPADM

## 2024-07-22 RX ORDER — SODIUM CHLORIDE 0.9 % (FLUSH) 0.9 %
5-40 SYRINGE (ML) INJECTION EVERY 12 HOURS SCHEDULED
Status: DISCONTINUED | OUTPATIENT
Start: 2024-07-22 | End: 2024-07-22 | Stop reason: HOSPADM

## 2024-07-22 RX ORDER — AMIODARONE HYDROCHLORIDE 150 MG/3ML
INJECTION, SOLUTION INTRAVENOUS PRN
Status: DISCONTINUED | OUTPATIENT
Start: 2024-07-22 | End: 2024-07-22 | Stop reason: HOSPADM

## 2024-07-22 RX ORDER — CLOPIDOGREL BISULFATE 75 MG/1
TABLET ORAL PRN
Status: DISCONTINUED | OUTPATIENT
Start: 2024-07-22 | End: 2024-07-22 | Stop reason: HOSPADM

## 2024-07-22 RX ORDER — METOPROLOL SUCCINATE 50 MG/1
50 TABLET, EXTENDED RELEASE ORAL DAILY
Qty: 90 TABLET | Refills: 3 | Status: SHIPPED | OUTPATIENT
Start: 2024-07-23

## 2024-07-22 RX ORDER — METOPROLOL TARTRATE 1 MG/ML
INJECTION, SOLUTION INTRAVENOUS PRN
Status: DISCONTINUED | OUTPATIENT
Start: 2024-07-22 | End: 2024-07-22 | Stop reason: HOSPADM

## 2024-07-22 RX ORDER — DILTIAZEM HYDROCHLORIDE 180 MG/1
180 CAPSULE, COATED, EXTENDED RELEASE ORAL 2 TIMES DAILY
Qty: 180 CAPSULE | Refills: 3 | Status: SHIPPED | OUTPATIENT
Start: 2024-07-22

## 2024-07-22 RX ORDER — HEPARIN SODIUM 1000 [USP'U]/ML
INJECTION, SOLUTION INTRAVENOUS; SUBCUTANEOUS PRN
Status: DISCONTINUED | OUTPATIENT
Start: 2024-07-22 | End: 2024-07-22 | Stop reason: HOSPADM

## 2024-07-22 RX ORDER — METOPROLOL SUCCINATE 50 MG/1
50 TABLET, EXTENDED RELEASE ORAL DAILY
Status: DISCONTINUED | OUTPATIENT
Start: 2024-07-22 | End: 2024-07-22 | Stop reason: HOSPADM

## 2024-07-22 RX ORDER — NITROGLYCERIN 0.4 MG/1
0.4 TABLET SUBLINGUAL EVERY 5 MIN PRN
Status: DISCONTINUED | OUTPATIENT
Start: 2024-07-22 | End: 2024-07-22 | Stop reason: HOSPADM

## 2024-07-22 RX ORDER — ONDANSETRON 2 MG/ML
4 INJECTION INTRAMUSCULAR; INTRAVENOUS EVERY 6 HOURS PRN
Status: DISCONTINUED | OUTPATIENT
Start: 2024-07-22 | End: 2024-07-22 | Stop reason: HOSPADM

## 2024-07-22 RX ORDER — ASPIRIN 81 MG/1
81 TABLET ORAL DAILY
Status: DISCONTINUED | OUTPATIENT
Start: 2024-07-23 | End: 2024-07-22 | Stop reason: HOSPADM

## 2024-07-22 RX ORDER — DILTIAZEM HYDROCHLORIDE 60 MG/1
180 TABLET, FILM COATED ORAL EVERY 12 HOURS SCHEDULED
Status: DISCONTINUED | OUTPATIENT
Start: 2024-07-22 | End: 2024-07-22

## 2024-07-22 RX ORDER — BIVALIRUDIN 250 MG/5ML
INJECTION, POWDER, LYOPHILIZED, FOR SOLUTION INTRAVENOUS PRN
Status: DISCONTINUED | OUTPATIENT
Start: 2024-07-22 | End: 2024-07-22 | Stop reason: HOSPADM

## 2024-07-22 RX ORDER — CLOPIDOGREL BISULFATE 75 MG/1
75 TABLET ORAL DAILY
Status: DISCONTINUED | OUTPATIENT
Start: 2024-07-23 | End: 2024-07-22 | Stop reason: HOSPADM

## 2024-07-22 RX ORDER — HYDROCODONE BITARTRATE AND ACETAMINOPHEN 5; 325 MG/1; MG/1
1 TABLET ORAL EVERY 4 HOURS PRN
Status: DISCONTINUED | OUTPATIENT
Start: 2024-07-22 | End: 2024-07-22 | Stop reason: HOSPADM

## 2024-07-22 RX ORDER — ASPIRIN 325 MG
TABLET, DELAYED RELEASE (ENTERIC COATED) ORAL PRN
Status: DISCONTINUED | OUTPATIENT
Start: 2024-07-22 | End: 2024-07-22 | Stop reason: HOSPADM

## 2024-07-22 RX ORDER — HYDROCODONE BITARTRATE AND ACETAMINOPHEN 5; 325 MG/1; MG/1
2 TABLET ORAL EVERY 4 HOURS PRN
Status: DISCONTINUED | OUTPATIENT
Start: 2024-07-22 | End: 2024-07-22 | Stop reason: HOSPADM

## 2024-07-22 RX ORDER — MIDAZOLAM HYDROCHLORIDE 1 MG/ML
INJECTION INTRAMUSCULAR; INTRAVENOUS PRN
Status: DISCONTINUED | OUTPATIENT
Start: 2024-07-22 | End: 2024-07-22 | Stop reason: HOSPADM

## 2024-07-22 RX ORDER — NITROGLYCERIN 20 MG/100ML
INJECTION INTRAVENOUS PRN
Status: DISCONTINUED | OUTPATIENT
Start: 2024-07-22 | End: 2024-07-22 | Stop reason: HOSPADM

## 2024-07-22 RX ORDER — CLOPIDOGREL BISULFATE 75 MG/1
75 TABLET ORAL DAILY
Qty: 90 TABLET | Refills: 3 | Status: SHIPPED | OUTPATIENT
Start: 2024-07-22

## 2024-07-22 RX ORDER — DILTIAZEM HYDROCHLORIDE 180 MG/1
180 CAPSULE, COATED, EXTENDED RELEASE ORAL 2 TIMES DAILY
Status: DISCONTINUED | OUTPATIENT
Start: 2024-07-22 | End: 2024-07-22 | Stop reason: HOSPADM

## 2024-07-22 RX ORDER — DILTIAZEM HYDROCHLORIDE 5 MG/ML
10 INJECTION INTRAVENOUS ONCE
Status: COMPLETED | OUTPATIENT
Start: 2024-07-22 | End: 2024-07-22

## 2024-07-22 RX ORDER — SODIUM CHLORIDE 9 MG/ML
INJECTION, SOLUTION INTRAVENOUS CONTINUOUS
Status: DISCONTINUED | OUTPATIENT
Start: 2024-07-22 | End: 2024-07-22 | Stop reason: HOSPADM

## 2024-07-22 RX ORDER — FENTANYL CITRATE 50 UG/ML
INJECTION, SOLUTION INTRAMUSCULAR; INTRAVENOUS PRN
Status: DISCONTINUED | OUTPATIENT
Start: 2024-07-22 | End: 2024-07-22 | Stop reason: HOSPADM

## 2024-07-22 RX ORDER — SODIUM CHLORIDE 0.9 % (FLUSH) 0.9 %
5-40 SYRINGE (ML) INJECTION PRN
Status: DISCONTINUED | OUTPATIENT
Start: 2024-07-22 | End: 2024-07-22 | Stop reason: HOSPADM

## 2024-07-22 RX ORDER — ASPIRIN 325 MG
325 TABLET ORAL ONCE
Status: DISCONTINUED | OUTPATIENT
Start: 2024-07-22 | End: 2024-07-22 | Stop reason: HOSPADM

## 2024-07-22 RX ORDER — SODIUM CHLORIDE 9 MG/ML
INJECTION, SOLUTION INTRAVENOUS PRN
Status: DISCONTINUED | OUTPATIENT
Start: 2024-07-22 | End: 2024-07-22 | Stop reason: HOSPADM

## 2024-07-22 RX ADMIN — SODIUM CHLORIDE: 9 INJECTION, SOLUTION INTRAVENOUS at 07:00

## 2024-07-22 RX ADMIN — DILTIAZEM HYDROCHLORIDE 180 MG: 180 CAPSULE, EXTENDED RELEASE ORAL at 12:01

## 2024-07-22 RX ADMIN — DILTIAZEM HYDROCHLORIDE 10 MG: 5 INJECTION INTRAVENOUS at 09:53

## 2024-07-22 RX ADMIN — APIXABAN 5 MG: 5 TABLET, FILM COATED ORAL at 11:49

## 2024-07-22 RX ADMIN — DILTIAZEM HYDROCHLORIDE 10 MG/HR: 5 INJECTION, SOLUTION INTRAVENOUS at 10:11

## 2024-07-22 RX ADMIN — METOPROLOL SUCCINATE 50 MG: 50 TABLET, EXTENDED RELEASE ORAL at 12:01

## 2024-07-22 RX ADMIN — APIXABAN 5 MG: 5 TABLET, FILM COATED ORAL at 17:06

## 2024-07-22 ASSESSMENT — ENCOUNTER SYMPTOMS
ABDOMINAL DISTENTION: 0
EYE DISCHARGE: 0
CHEST TIGHTNESS: 1
SHORTNESS OF BREATH: 1
BACK PAIN: 0
COUGH: 0
DIARRHEA: 0
VOMITING: 0
WHEEZING: 0
BLOOD IN STOOL: 0
CONSTIPATION: 0

## 2024-07-22 NOTE — H&P
at origin of small 1st obtuse  marginal. Feeds a large 2nd obtuse marginal.       Lesion on Prox CX: 85% stenosis reduced to 0%. Pre procedure ROMA III    flow was noted. Post Procedure ROMA III flow was present. The guidewire    cross was successful. The lesion was diagnosed as Low Risk (A).       Treatment results:Interventional treatment was successful.       Comments:Circumflex proximal to mid 85% stenosis at origin of small 1st    obtuse marginal.    Proximal to mid circumflex stented with 2.75 x 14 mm resolute integrity.    Proximal portion postdilated with 3.25 mm balloon.       Devices used       - Balanced Middleweight Universal. Number of passes: 1.       - [employer] Euphora 2.0mm x12mm RX Balloon Catheter. 1 inflation(s) to    a max pressure of: 12 thomas.       - Resolute Integrity 2.75 x 14 RIN. Diameter: 2.75 mm. Length: 14 mm. 1    inflation(s) to a max pressure of: 12 thomas.       - Abbott 3.36gqE1tw NC Trek RX Balloon Catheter. Diameter: 3.25 mm.    Length: 8 mm. 1 inflation(s) to a max pressure of: 14 thomas.     RCA: RCA is a large dominant vessel. Diffuse calcific disease throughout  body.  RCA mid 35% stenosis.  RPDA proximal 50% stenosis.       Lesion on Prox RCA: 35% stenosis 17 mm length.      VA     LV function assessed as:Normal.  Ejection Fraction    - Method: LV gram. EF%: 55.       Assessment and Recommendations:    This is a 69 y.o. year old female with past medical history of hypertension, diabetes mellitus type 2, prior longstanding tobacco use, coronary artery disease with prior PCI 9/27/2022 proximal to mid circumflex with RIN, intermediate ostial LAD stenosis, calcific coronary artery disease, recent onset of symptoms suggestive of anginal equivalent, being referred for cardiac catheterization.    Risks, benefits, alternatives of cardiac catheterization/PCI discussed with the patient and full informed consent obtained.  Mallampati score 2  Consent for moderate conscious sedation  ASA 3

## 2024-07-22 NOTE — DISCHARGE INSTRUCTIONS
1.  Do not subject hand/arm to any forceful movements for 24 hours (ie supporting weight when rising from a chair or bed).  2.  For 2 Days following discharge:         Do not operate a motor vehicle, tractor, lawnmower, motorcycle or all-terrain vehicle.         Do not lift anything heavier than 1 pound with affected arm.         Avoid excessive ( extension/ flexion) wrist movement.  3.  Avoid heavy lifting with affected arm for 3 days following discharge.  4.  Do not engage in vigorous exercise(ie tennis) using the affected arm for 5 days following discharge.  5.  If bleeding should occur while in hospital, apply firm pressure to the site an call your nurse.  6.  If bleeding should occur following discharge:         Sit down and apply firm pressure to site with your fingers x 10 minutes.         If the bleeding stops, continue to sit quietly, keeping your wrist straight for 2 hours. Notify you MD as soon as possible.         If bleeding does not stop after 10 minutes or if there is a large amount of bleeding or spurting, call 911 immediately. Do not drive yourself to the hospital.  7.  Remove bandage 24 hours following application and replace with a bandaid.  8.  You may shower on the day following your procedure. Do not take a tub bath for 3 days following discharge. Do not submerge arm in dishwater or other water sources for 5 days.  9.  Expect mild tingling of hand and tenderness at the puncture site for up to 3 days. If this persists or other symptoms develop, notify your nurse/physician.  10. If any signs of infection should occur, such as : redness, swelling, drainage, fever over 101 degrees, or pain notify your physician immediately.

## 2024-07-22 NOTE — FLOWSHEET NOTE
Pt arrived back to cath holding on amiodarone gtt infusion at 33.3 ml/hr. Orders received from Dr. Garcia to give cardizem bolus and gtt. Clarified with Dr. Garcia that both medications should be used. Pt alert and oriented. VS stable. Denies needs at present.

## 2024-07-22 NOTE — PROCEDURES
PROCEDURE NOTE  Date: 7/22/2024   Name: Aissatou Snowden  YOB: 1955    Procedures      Cardiac catheterization preliminary note:    Significant calcific disease.  Circumflex proximal to mid stent is widely patent.  LAD ostial 55% stenosis which is unchanged.  New lesion in mid RPL at 80%.  Left ventriculogram triggers atrial fibrillation with RVR that appears persistent despite bolus of IV amiodarone and IV Lopressor.  EF normal.  Successful PCI to mid RPL.  Balloon/stent inflation does temporarily trigger symptoms similar to her upper back pain that she had been experiencing.  Atrial fibrillation similar to her palpitations that she experiences that has woken her up.  Usually lasts half an hour to an hour and resolves as she takes her Imdur.  Unfortunately she sustained left rib fractures when she fell accidentally 2 weeks ago.  Hold off DCCV at this time and will monitor and holding with rate control if she converts spontaneously.  If not we will return for DCCV in 4 weeks on anticoagulation.  Initiate Eliquis 5 mg twice daily.  Plavix uninterrupted for 6 months.  Reported allergy to Norvasc that causes swelling.  Diltiazem is a different chemical and should be safe to utilize for rate control at this time.

## 2024-07-23 ENCOUNTER — TELEPHONE (OUTPATIENT)
Dept: CARDIOLOGY CLINIC | Age: 69
End: 2024-07-23

## 2024-07-23 LAB
EKG P AXIS: 36 DEGREES
EKG P AXIS: NORMAL DEGREES
EKG P-R INTERVAL: 140 MS
EKG P-R INTERVAL: NORMAL MS
EKG Q-T INTERVAL: 344 MS
EKG Q-T INTERVAL: 394 MS
EKG QRS DURATION: 86 MS
EKG QRS DURATION: 88 MS
EKG QTC CALCULATION (BAZETT): 408 MS
EKG QTC CALCULATION (BAZETT): 419 MS
EKG T AXIS: 13 DEGREES
EKG T AXIS: 18 DEGREES

## 2024-07-23 PROCEDURE — 93010 ELECTROCARDIOGRAM REPORT: CPT | Performed by: INTERNAL MEDICINE

## 2024-07-23 NOTE — PROGRESS NOTES
Discharge instructions reviewed with patient and patient states understanding. Patient discharged from cath holding with all belongings and AVS. Stent card and cardiac rehab brochure given to pt. Pt's  plans to drive pt home. R radial site cdi with no s/s of bleeding or hematoma upon dc. Electronically signed by Francia Greene RN on 7/22/2024 at 7:23 PM

## 2024-07-23 NOTE — CONSULTS
Cardiac Rehab MI/PTCA/Stent education packet was sent to the patient's address on record.  Handouts titled; \"Home Instructions Following a Cardiac Event\", \"A Healthy Heart: Care Instructions\",  \"Cardiac Diet/Low Cholesterol\", \"Cardiac Rehabilitation: An Individualized Supervised Program For You\" and a [facility] Cardiac [facility] Pulmonary Rehabilitation brochure were included.  Patient was instructed to contact [facility] or the hospital nearest their residence for the opportunity to enroll in Phase II Outpatient Cardiac Rehab.

## 2024-07-23 NOTE — TELEPHONE ENCOUNTER
Patient called stating her eliquis is going to cost her $600 after insurance. She stated she can't afford that. She states that her heart feels like it is in rhythm today. HR last night before bed was 64 and she doesn't feel any heart flutters. Advised patient that I would call in 30 day free trial for her, she is medicare so the monthly discount card will not work for her. I called [facility] and spoke with Spring. I provided her a 30 day trial card with RxBin: [id number], Grp: [id number], RxPCN: [id number], and ID [id number]. She will call back if there any issues with card. Patient stated she will not be able to take the eliquis past the 30 days due to insurance price and wanted to know what else she could take or if she needed it since she feels back in rythmn. She has follow up with Dang on 8/2/2024. Advised she can use the free 30 day in the interim.

## 2024-07-25 NOTE — TELEPHONE ENCOUNTER
I called patient back to let her know about medication options and she stated she actually spoke with [facility] pharmacy who told her the eliquis would be free for her and she should receive it in 5-7 days. She said if that doesn't work out, her friend works at a doctors office in [place] and told her that she could get the eliquis at [facility] for $46. Patient will call me back and let me know if she was able to receive from [employer]. She is also currently anticoagulated on eliquis with 30 day free trial.

## 2024-08-02 ENCOUNTER — OFFICE VISIT (OUTPATIENT)
Dept: CARDIOLOGY CLINIC | Age: 69
End: 2024-08-02
Payer: MEDICARE

## 2024-08-02 VITALS
SYSTOLIC BLOOD PRESSURE: 120 MMHG | HEIGHT: 65 IN | HEART RATE: 50 BPM | DIASTOLIC BLOOD PRESSURE: 60 MMHG | BODY MASS INDEX: 26.33 KG/M2 | WEIGHT: 158 LBS

## 2024-08-02 DIAGNOSIS — I48.0 PAROXYSMAL ATRIAL FIBRILLATION (HCC): ICD-10-CM

## 2024-08-02 DIAGNOSIS — I25.10 CORONARY ARTERY DISEASE INVOLVING NATIVE CORONARY ARTERY OF NATIVE HEART WITHOUT ANGINA PECTORIS: Primary | ICD-10-CM

## 2024-08-02 DIAGNOSIS — I10 PRIMARY HYPERTENSION: ICD-10-CM

## 2024-08-02 PROCEDURE — 93000 ELECTROCARDIOGRAM COMPLETE: CPT | Performed by: CLINICAL NURSE SPECIALIST

## 2024-08-02 PROCEDURE — 3074F SYST BP LT 130 MM HG: CPT | Performed by: CLINICAL NURSE SPECIALIST

## 2024-08-02 PROCEDURE — 3078F DIAST BP <80 MM HG: CPT | Performed by: CLINICAL NURSE SPECIALIST

## 2024-08-02 PROCEDURE — 1123F ACP DISCUSS/DSCN MKR DOCD: CPT | Performed by: CLINICAL NURSE SPECIALIST

## 2024-08-02 PROCEDURE — 99214 OFFICE O/P EST MOD 30 MIN: CPT | Performed by: CLINICAL NURSE SPECIALIST

## 2024-08-02 RX ORDER — METOPROLOL SUCCINATE 50 MG/1
25 TABLET, EXTENDED RELEASE ORAL DAILY
Qty: 90 TABLET | Refills: 3 | Status: SHIPPED
Start: 2024-08-02

## 2024-08-02 NOTE — PROGRESS NOTES
[facility] Cardiology  [address] [address]  Phone: [phone]  Fax: [phone]    OFFICE VISIT:  2024    Aissatou Snowden - : 1955    Reason For Visit:  Aissatou is a 69 y.o. female who is here for Follow-up (No symptoms) and Coronary Artery Disease  History of coronary disease with PCI to circumflex in , hypertension, previous tobacco abuse underwent heart catheterization with Dr. Garcia 2024.  Patent stent in circumflex.  Unchanged LAD stenosis.  Successful PCI to mid RPL.  LV gram triggered A-fib with RVR.  Given bolus of IV amiodarone and Lopressor.  Normal EF.  Cardioversion deferred due to recent rib fractures.    Here today in follow-up.  Remains anticoagulant Eliquis.  She has not had any abnormal bleeding.  She felt like last week heart back in rhythm last Thursday.  She states she has been more tired and fatigued.  Heart rate has been low        Subjective  Aissatou denies exertional chest pain, shortness of breath, orthopnea, paroxysmal nocturnal dyspnea, syncope, presyncope, arrhythmia, edema and fatigue.  The patient denies numbness or weakness to suggest cerebrovascular accident or transient ischemic attack.    Emily Amaya APRN - CNP is PCP and follows labs .  Aissatou Snowden has the following history as recorded in [facility]:    Patient Active Problem List    Diagnosis Date Noted    Coronary atherosclerosis of native coronary artery 2024    SOB (shortness of breath) 2024    History of coronary artery stent placement 10/05/2020    Coronary artery disease involving native coronary artery of native heart without angina pectoris 10/05/2020    Mixed hyperlipidemia 10/05/2020    Adrenal nodule (HCC) 2020    Obesity (BMI 30-39.9) 2020    Osteoporosis 2016    HTN (hypertension) 12/10/2013    Vitamin D deficiency 2013    Elevated cholesterol 2012    Edema 2011    Generalized osteoarthrosis, involving multiple sites

## 2024-08-02 NOTE — PATIENT INSTRUCTIONS
Cut your Toprol in 1/2 - take 25mg in the evening  Watch for recurrent afib.    Maintain good blood pressure control-goal<130/80 at rest  Maintain good cholesterol control LDL goal<70 with arterial disease  If you are diabetic work to keep/obtain hemoglobin A1c< 7    Follow up in Aug as planned  Call with any questions or concerns  Follow up with Emily Amaya APRN - CNP for non cardiac problems  Report any new problems  Cardiovascular Fitness-Exercise as tolerated.  Strive for 30 minutes of exercise most days of the week.    Cardiac / Healthy Diet- Avoid processed high fat foods, maintain low sodium/salt   Continue current medications as directed  Continue plan of treatment  It is always recommended that you bring your medications bottles with you to each visit - this is for your safety!

## 2024-08-29 ENCOUNTER — OFFICE VISIT (OUTPATIENT)
Dept: CARDIOLOGY CLINIC | Age: 69
End: 2024-08-29
Payer: MEDICARE

## 2024-08-29 VITALS
OXYGEN SATURATION: 97 % | DIASTOLIC BLOOD PRESSURE: 68 MMHG | BODY MASS INDEX: 26.66 KG/M2 | WEIGHT: 160 LBS | HEIGHT: 65 IN | HEART RATE: 65 BPM | SYSTOLIC BLOOD PRESSURE: 120 MMHG

## 2024-08-29 DIAGNOSIS — I25.10 CORONARY ARTERY DISEASE INVOLVING NATIVE CORONARY ARTERY OF NATIVE HEART WITHOUT ANGINA PECTORIS: Primary | ICD-10-CM

## 2024-08-29 DIAGNOSIS — I48.0 PAF (PAROXYSMAL ATRIAL FIBRILLATION) (HCC): ICD-10-CM

## 2024-08-29 DIAGNOSIS — I10 ESSENTIAL HYPERTENSION: ICD-10-CM

## 2024-08-29 PROCEDURE — 1123F ACP DISCUSS/DSCN MKR DOCD: CPT | Performed by: NURSE PRACTITIONER

## 2024-08-29 PROCEDURE — 99214 OFFICE O/P EST MOD 30 MIN: CPT | Performed by: NURSE PRACTITIONER

## 2024-08-29 PROCEDURE — 3078F DIAST BP <80 MM HG: CPT | Performed by: NURSE PRACTITIONER

## 2024-08-29 PROCEDURE — 3074F SYST BP LT 130 MM HG: CPT | Performed by: NURSE PRACTITIONER

## 2024-08-29 ASSESSMENT — ENCOUNTER SYMPTOMS
CHEST TIGHTNESS: 0
WHEEZING: 0
SHORTNESS OF BREATH: 0
COUGH: 0
SORE THROAT: 0

## 2024-08-29 NOTE — PROGRESS NOTES
[facility] Cardiology   Established Patient Office Visit  [address]  [address]  [facility] [address]  [phone]        OFFICE VISIT:  2024    Aissatou Snowden - : 1955    Reason For Visit:  Aissatou is a 69 y.o. female who is here for Follow-up    1. Coronary artery disease involving native coronary artery of native heart without angina pectoris    2. PAF (paroxysmal atrial fibrillation) ([facility], [facility])    3. Essential hypertension        Patient with a history of CAD with PCI to circumflex in , hypertension, previous tobacco abuse.  Patient underwent heart cardiac catheterization by Dr. Garcia on 2024.  Patent stent in the circumflex.  Unchanged LAD stenosis.  Successful PCI to mid RPL.  LV gram triggered A-fib with RVR.  She was given bolus of IV amiodarone and Lopressor.  She had a normal EF.  Cardioversion was deferred due to recent rib fractures.    Patient was last seen in our office on 2024.  She felt as if she might have been back in rhythm.  EKG showed sinus bradycardia.  She was stating that she was more tired and fatigued.  And that her heart rates had been low.  Her metoprolol was cut back to 25 mg to see if that would help with her fatigue.    Patient presents back to clinic today for medication adjustment with going down on the metoprolol.  Patient states about 5 days after going down to 25 mg of metoprolol she had episode of atrial fib.  So she took an additional 25 mg of metoprolol.  And has currently been taking 25 mg twice daily.  She has had no episodes of atrial fib on the 25 mg twice a day.  However she states she just feels tired and fatigued all the time being on a beta-blocker.  She has talked with some of her friends about seeing Dr. Frazier for possible A-fib ablation.  She would like to do that.  She still wants to keep Dr. Garcia as her interventional cardiologist though.        Subjective    Aissatou Snowden is a 69 y.o. female with the following history as recorded in

## 2024-09-06 NOTE — ED PROVIDER NOTES
[facility]  EMERGENCY DEPARTMENT ENCOUNTER      Pt Name: Liliane Charles  MRN: [id number]  [place] 1955  Date of evaluation: 9/25/2020  Provider: Celso Castro MD    CHIEF COMPLAINT       Chief Complaint   Patient presents with    Shortness of Breath     pt presents to ED with c/o shortness of breath that started 1 month ago. states more short of breath with exhertion. HISTORY OF PRESENT ILLNESS   (Location/Symptom, Timing/Onset,Context/Setting, Quality, Duration, Modifying Factors, Severity)  Note limiting factors. Liliane Charles is a 63 y.o. female who presents to the emergency department for evaluation after having an episode of shortness of breath and chest tightness this morning associated with palpitations, tachycardia, and elevated blood pressure. States she has had several episodes similar to this recently and the first 1 was on Labor Day. States during that initial episode she had more chest pressure with pain in her back during the time she was having the other symptoms including shortness of breath and palpitations. States she has had shortness of breath with exertion since then as well as a few sporadic episodes of the pain and palpitations with shortness of breath. Denies any recent worsening of leg swelling. Takes 20 mg of Lasix daily with no recent change. States this morning during the episode she checked her blood pressure and it was elevated to the 342 systolic and her heart rate was elevated to around 120. This episode lasted about 3 hours this morning. Currently states she feels better but still gets very short of breath with even minimal exertion. Patient has no previous cardiac history but did have several 40% blockages on a heart catheterization 4 years ago. Has a history of hypertension as well as significant family history of coronary artery disease with previous MIs. HPI    NursingNotes were reviewed.     REVIEW OF SYSTEMS    (2-9 systems for level 4, 10 or Medicare Wellness Billing Compliance Satisfied    *This is a visual tool to show completion of required items on the day of the visit. Green checks will only appear on the date of visit.    Review all medications by prescribing practitioner or clinical pharmacist (such as prescriptions, OTCs, herbal therapies and supplements) documented in the medical record    Past Medical, Surgical, and Family History reviewed and updated in chart    Tobacco Use Reviewed    Alcohol Use Reviewed    Illicit Drug Use Reviewed    PHQ2/9    Falls in Last Year Reviewed    Home Safety Risk Factors Reviewed    Cognitive Impairment Reviewed    Patient Self Assessment and Health Status    Current Diet Reviewed    Exercise Frequency    ADL - Hearing Impairment    ADL - Bathing    ADL - Dressing    ADL - Walks in Home    IADL - Managing Finances    IADL - Grocery Shopping    IADL - Taking Medications    IADL - Doing Housework    Subjective   Reason for Visit: Armida Adkins is an 34 y.o. female here for a Medicare Wellness visit.     Past Medical, Surgical, and Family History reviewed and updated in chart.    Reviewed all medications by prescribing practitioner or clinical pharmacist (such as prescriptions, OTCs, herbal therapies and supplements) and documented in the medical record.    HPI    Armida Adkins is a 33 y.o. female with past medical history of fibromyalgia, recurrent right shoulder muscle spasms, atrial flutter treated surgically, asthma, and pelvic pain managed by vascular came for Medicare wellness visit.  Was complaining of right ear rt ear clogged. Too loud sounds make ear clogged, no ear issues. Have sinus issues. No ear pain. Had ortho visit for neck pain recommended physical therapy, treated with high dose steroids, recommended cervical collar if pain comes back next time, also make an appt with Ortho, might need steroid injection. Pelvic pain controlled, vascular did multiple surgeries, now  "following with OB GYN. Still have back pain. Can't stand long. Back in June , was admitted in [facility] ([facility] for having suicidal thoughts, stayed there for 5 days.  prescribed Cymbalta 30 mg for 14 days which is helping her. She will see a Psychiatrist at the end of this month. Also mentioned about rushing to bathroom at night and early morning for 3 weeks.  She had multiple pelvic vascular surgeries recently.  Due for all routine labs.    Patient Care Team:  Lorin Chung MD as PCP - General     Review of Systems   HENT:          Right ear clogged, no hearing loss or tinnitus or pain, redness   Genitourinary:  Positive for frequency.   Musculoskeletal:  Positive for back pain and neck pain.       Objective   Vitals:  /75 (BP Location: Right arm, Patient Position: Sitting, BP Cuff Size: Adult)   Pulse 80   Ht 1.626 m (5' 4\")   Wt 66.7 kg (147 lb)   BMI 25.23 kg/m²       Physical Exam  Cardiovascular:      Rate and Rhythm: Normal rate and regular rhythm.      Pulses: Normal pulses.      Heart sounds: Normal heart sounds.   Pulmonary:      Effort: Pulmonary effort is normal.      Breath sounds: Normal breath sounds.   Abdominal:      General: Bowel sounds are normal.      Palpations: Abdomen is soft.   Musculoskeletal:         General: Normal range of motion.   Neurological:      Mental Status: She is alert. Mental status is at baseline.         Assessment & Plan  Health care maintenance    Orders:    Tsh With Reflex To Free T4 If Abnormal; Future    Comprehensive metabolic panel; Future    Lipid panel; Future    Hemoglobin A1c; Future    Vitamin D deficiency    Orders:    Vitamin D 25-Hydroxy,Total (for eval of Vitamin D levels); Future  -Takes over-the-counter calcium and vitamin D supplementation  Routine general medical examination at health care facility    Orders:    HIV 1/2 Antigen/Antibody Screen with Reflex to Confirmation; Future    Hepatitis C antibody; " Future    Vitamin B12; Future    Hypokalemia    Orders:    Magnesium; Future    Magnesium, RBC; Future  - Currently not on any KCl supplementation    # Chronic pelvic pain   - follows with OB/GYN    # Chronic back pain  -Follows with Ortho pain management  -On Flexeril 10 mg as needed    # Depression  -PHQ-9 score 2  -Not suicidal  -On Cymbalta 30 mg for 14 days prescribed by Mark Davis  -Will follow-up with psychiatrist this month             Hypertension Other         paternal side as well          SOCIAL HISTORY       Social History     Socioeconomic History    Marital status:      Spouse name: Not on file    Number of children: Not on file    Years of education: Not on file    Highest education level: Not on file   Occupational History    Not on file   Social Needs    Financial resource strain: Not on file    Food insecurity     Worry: Not on file     Inability: Not on file    Transportation needs     Medical: Not on file     Non-medical: Not on file   Tobacco Use    Smoking status: Former Smoker     Packs/day: 1.00     Types: Cigarettes     Last attempt to quit: 2014     Years since quittin.7    Smokeless tobacco: Never Used   Substance and Sexual Activity    Alcohol use: No    Drug use: No    Sexual activity: Not on file   Lifestyle    Physical activity     Days per week: Not on file     Minutes per session: Not on file    Stress: Not on file   Relationships    Social connections     Talks on phone: Not on file     Gets together: Not on file     Attends Mu-ism service: Not on file     Active member of club or organization: Not on file     Attends meetings of clubs or organizations: Not on file     Relationship status: Not on file    Intimate partner violence     Fear of current or ex partner: Not on file     Emotionally abused: Not on file     Physically abused: Not on file     Forced sexual activity: Not on file   Other Topics Concern    Not on file   Social History Narrative    Not on file       SCREENINGS             PHYSICAL EXAM    (up to 7 for level 4, 8 or more for level 5)     ED Triage Vitals [20 1725]   BP Temp Temp Source Pulse Resp SpO2 Height Weight   (!) 152/77 98.1 °F (36.7 °C) Oral 97 16 97 % 5' 5\" (1.651 m) 185 lb (83.9 kg)       Physical Exam  Vitals signs and nursing note reviewed. Constitutional:       General: She is not in acute distress. Appearance: She is well-developed.  She is not diaphoretic. HENT:      Head: Normocephalic and atraumatic. Eyes:      General: No scleral icterus. Neck:      Vascular: No JVD. Cardiovascular:      Rate and Rhythm: Normal rate and regular rhythm. Pulses:           Radial pulses are 2+ on the right side and 2+ on the left side. Dorsalis pedis pulses are 2+ on the right side and 2+ on the left side. Heart sounds: Normal heart sounds. No murmur. No friction rub. No gallop. Pulmonary:      Effort: Pulmonary effort is normal. No accessory muscle usage or respiratory distress. Breath sounds: Normal breath sounds. No stridor. No decreased breath sounds, wheezing, rhonchi or rales. Chest:      Chest wall: No tenderness. Abdominal:      General: There is no distension. Palpations: Abdomen is soft. Tenderness: There is no abdominal tenderness. There is no guarding or rebound. Musculoskeletal: Normal range of motion. General: No deformity. Right lower leg: No edema. Left lower leg: No edema. Skin:     General: Skin is warm and dry. Coloration: Skin is not pale. Findings: No erythema. Neurological:      Mental Status: She is alert and oriented to person, place, and time. GCS: GCS eye subscore is 4. GCS verbal subscore is 5. GCS motor subscore is 6. Cranial Nerves: No cranial nerve deficit. Motor: No abnormal muscle tone.       Coordination: Coordination normal.   Psychiatric:         Behavior: Behavior normal.         Judgment: Judgment normal.         DIAGNOSTIC RESULTS     EKG: All EKG's are interpreted by the Emergency Department Physician who either signs or Co-signs this chart in the absence of a cardiologist.    RADIOLOGY:   Non-plain film images such as CT, Ultrasound and MRI are read by the radiologist. Plainradiographic images are visualized and preliminarily interpreted by the emergency physician with the below findings:        Interpretation per the Radiologist below, if available at the time of this note:    CTA PULMONARY W CONTRAST   Final Result   1. No pulmonary emboli. 2. Dense coronary artery calcifications. Mild cardiomegaly. Trace   pericardial fluid or thickening. No thoracic aortic aneurysm or   dissection. 3. Mild fluid distended esophagus may be due to gastroesophageal   reflux. Poor primary esophageal peristalsis or lower esophageal   stenosis considered in the differential. This places patient at   increased risk for aspiration. No aspiration pneumonia seen on the   current exam. Basilar atelectasis. Emphysematous change. 4. 5.5 cm fatty-containing left adrenal nodule favorable for a   myelolipoma. Signed by Dr Brena Burkitt on 9/25/2020 8:17 PM      XR CHEST PORTABLE   Final Result   1. Probable right basilar atelectasis, otherwise no acute radiographic   cardiopulmonary process. .   Signed by Dr Brena Burkitt on 9/25/2020 6:03 PM            ED BEDSIDE ULTRASOUND:   Performed by ED Physician - none    LABS:  Labs Reviewed   CBC WITH AUTO DIFFERENTIAL - Abnormal; Notable for the following components:       Result Value    MCH 31.1 (*)     RDW 14.6 (*)     All other components within normal limits   COMPREHENSIVE METABOLIC PANEL W/ REFLEX TO MG FOR LOW K - Abnormal; Notable for the following components:    Glucose 118 (*)     Total Protein 6.4 (*)     ALT <5 (*)     All other components within normal limits   BRAIN NATRIURETIC PEPTIDE - Abnormal; Notable for the following components:    Pro-BNP 1,509 (*)     All other components within normal limits   URINE RT REFLEX TO CULTURE   PROTIME-INR   APTT   D-DIMER, QUANTITATIVE   TROPONIN       All other labs were within normal range or not returned as of this dictation.     Medications   albuterol (PROVENTIL) nebulizer solution 2.5 mg (has no administration in time range)   hydrocortisone (ANUSOL-HC) suppository 25 mg (has no administration in time range)   furosemide (LASIX) tablet 20 mg (has no administration in time range)   iopamidol (ISOVUE-370) 76 % injection 90 mL (90 mLs Intravenous Given 9/25/20 2001)       EMERGENCY DEPARTMENT COURSE and DIFFERENTIALDIAGNOSIS/MDM:   Vitals:    Vitals:    09/25/20 1725 09/25/20 1930   BP: (!) 152/77 120/69   Pulse: 97 88   Resp: 16 15   Temp: 98.1 °F (36.7 °C)    TempSrc: Oral    SpO2: 97% 94%   Weight: 185 lb (83.9 kg)    Height: 5' 5\" (1.651 m)        MDM    ED Course as of Sep 25 2111   Fri Sep 25, 2020   2103 EKG shows some anterior Q waves with no findings of acute ischemia or infarction. Laboratory evaluation does show elevated BNP to 1500 with no prior history of CHF. Laboratory evaluation is otherwise unremarkable. Chest x-ray shows no signs of fluid overload. CTA of the chest is ordered which does show significant amount of coronary artery calcifications but no pulmonary embolus or other pulmonary pathology. Given patient's description of the initial episode on Labor Day, sounds like that may have been representative of a missed MI. No prior EKGs for comparison. Patient is currently asymptomatic during time of evaluation here but is still having shortness of breath with very minimal exertion. Given patient's presentation and risk factors, believe she requires further cardiac evaluation. Initially discussed options for hospitalization versus discharge home, but after CT showed the extensive calcifications of the coronary arteries, advised patient I felt hospitalization for further monitoring and work-up here is likely a safer option for her. [WALDEMAR]      ED Course User Index  [WALDEMAR] Heron Elliott MD     Based on the evaluation and work-up here patient is felt to require further monitoring, work-up, or treatment that is available in the emergency department. Case was discussed with hospitalist who agrees for observation or admission for further management.   Treatment and stabilization as necessary were provided in the emergency department prior to transfer of care to the medicine service. CONSULTS:  None    PROCEDURES:  Unless otherwise notedbelow, none     Procedures      FINAL IMPRESSION     1. Exertional shortness of breath    2. Chest pain due to myocardial ischemia, unspecified ischemic chest pain type    3. Elevated brain natriuretic peptide (BNP) level    4. Coronary artery calcification seen on CAT scan          DISPOSITION/PLAN   DISPOSITION        PATIENT REFERRED TO:  No follow-up provider specified.     DISCHARGE MEDICATIONS:  New Prescriptions    No medications on file          (Please note that portions of this note were completed with a voice recognition program.  Efforts were made to edit the dictations butoccasionally words are mis-transcribed.)    Abby Unger MD (electronically signed)  AttendingEmergency Physician          Abby Unger., MD  09/25/20 [id number]

## 2024-10-08 ENCOUNTER — OFFICE VISIT (OUTPATIENT)
Dept: CARDIOLOGY CLINIC | Age: 69
End: 2024-10-08
Payer: MEDICARE

## 2024-10-08 VITALS
DIASTOLIC BLOOD PRESSURE: 80 MMHG | HEIGHT: 65 IN | SYSTOLIC BLOOD PRESSURE: 144 MMHG | BODY MASS INDEX: 26.66 KG/M2 | HEART RATE: 84 BPM | WEIGHT: 160 LBS

## 2024-10-08 DIAGNOSIS — I48.0 PAROXYSMAL ATRIAL FIBRILLATION (HCC): Primary | ICD-10-CM

## 2024-10-08 PROCEDURE — 1123F ACP DISCUSS/DSCN MKR DOCD: CPT | Performed by: INTERNAL MEDICINE

## 2024-10-08 PROCEDURE — 3079F DIAST BP 80-89 MM HG: CPT | Performed by: INTERNAL MEDICINE

## 2024-10-08 PROCEDURE — 99204 OFFICE O/P NEW MOD 45 MIN: CPT | Performed by: INTERNAL MEDICINE

## 2024-10-08 PROCEDURE — 3077F SYST BP >= 140 MM HG: CPT | Performed by: INTERNAL MEDICINE

## 2024-10-08 RX ORDER — BETAXOLOL 10 MG/1
5 TABLET, FILM COATED ORAL DAILY
Qty: 30 TABLET | Refills: 5 | Status: SHIPPED | OUTPATIENT
Start: 2024-10-08

## 2024-10-08 RX ORDER — ATENOLOL 25 MG/1
25 TABLET ORAL DAILY
Qty: 30 TABLET | Refills: 3 | Status: SHIPPED | OUTPATIENT
Start: 2024-10-08 | End: 2024-10-08

## 2024-10-08 NOTE — PROGRESS NOTES
[facility]  [address]  [address]  [facility] [address]  [phone]        Aissatou Snowden is a 69 y.o. female presents with paroxysmal atrial fibrillation.  She has had this a couple times and interestingly enough it resolved after she had stenting performed in her coronaries by Dr. Garcia.  She has been placed on metoprolol as well as diltiazem and the metoprolol has been gradually decreased in stepwise fashion because of severe fatigue with it.  She is only taking 12.5 mg now and she still does not like it.      Review of Systems   Constitutional: Negative for fever, chills, diaphoresis, activity change, appetite change, fatigue and unexpected weight change.   Eyes: Negative for photophobia, pain, redness and visual disturbance.   Respiratory: Negative for apnea, cough, chest tightness, shortness of breath, wheezing and stridor.    Cardiovascular: Negative for chest pain, palpitations and leg swelling.   Gastrointestinal: Negative for abdominal distention.   Genitourinary: Negative for dysuria, urgency and frequency.   Musculoskeletal: Negative for myalgias, arthralgias and gait problem.   Skin: Negative for color change, pallor, rash and wound.   Neurological: Negative for dizziness, tremors, speech difficulty, weakness and numbness.   Hematological: Does not bruise/bleed easily.   Psychiatric/Behavioral: Negative.      Past Medical History:   Diagnosis Date    CAD (coronary artery disease)     DJD (degenerative joint disease)     knees    Hypertension         Past Surgical History:   Procedure Laterality Date    CARDIAC PROCEDURE N/A 2024    Left heart cath / coronary angiography performed by Noel Garcia MD at [facility] CARDIAC CATH LAB    CARDIAC PROCEDURE N/A 2024    Percutaneous coronary intervention performed by Noel Garcia MD at [facility] CARDIAC CATH LAB    CARPAL TUNNEL RELEASE      right arm     SECTION      CORONARY ANGIOPLASTY   and     DIAGNOSTIC CARDIAC CATH LAB PROCEDURE

## 2024-10-22 ENCOUNTER — TELEPHONE (OUTPATIENT)
Dept: CARDIOLOGY CLINIC | Age: 69
End: 2024-10-22

## 2024-10-22 NOTE — TELEPHONE ENCOUNTER
Received a call from patient, she advised that when she was here on 10/8 Dr. Frazier started her on betaxolol 5 mg daily.  She thought he had told her she could take extra should she have an episode of afib.  She is wanting to know how much extra to take and how often should she have an afib episode?

## 2024-10-23 NOTE — TELEPHONE ENCOUNTER
Notified patient of recommendations from Dr. Frazier. Patient wanted an appt with Mickey or Dr. Frazier today. I notified her that dr. Frazier is not in the office today and that mickey only had a 9:15 am opening, pt refused that appt. I told her I would squeeze her in our schedule with Dr. Frazier tomorrow to address. She stating she would rather do that. I have scheduled her for 10:30 am tomorrow with dr. Frazier. Pt wondered if she could go back to taking the metoprolol too. I advised her that I cannot give instructions in regards to metoprolol as Dr. Fraizer had not prescribed that and we do not have it on her medication list. She voiced understanding and said she would take the betaxolol as dr. Frazier advised to help with the afib.

## 2024-10-24 ENCOUNTER — OFFICE VISIT (OUTPATIENT)
Dept: CARDIOLOGY CLINIC | Age: 69
End: 2024-10-24
Payer: MEDICARE

## 2024-10-24 VITALS
DIASTOLIC BLOOD PRESSURE: 72 MMHG | HEIGHT: 65 IN | WEIGHT: 161 LBS | HEART RATE: 136 BPM | SYSTOLIC BLOOD PRESSURE: 136 MMHG | BODY MASS INDEX: 26.82 KG/M2

## 2024-10-24 DIAGNOSIS — Z01.818 PRE-OP TESTING: ICD-10-CM

## 2024-10-24 DIAGNOSIS — I48.0 PAROXYSMAL ATRIAL FIBRILLATION (HCC): Primary | ICD-10-CM

## 2024-10-24 DIAGNOSIS — I48.0 PAROXYSMAL ATRIAL FIBRILLATION (HCC): ICD-10-CM

## 2024-10-24 LAB
ANION GAP SERPL CALCULATED.3IONS-SCNC: 12 MMOL/L (ref 7–19)
BASOPHILS # BLD: 0.1 K/UL (ref 0–0.2)
BASOPHILS NFR BLD: 0.4 % (ref 0–1)
BUN SERPL-MCNC: 14 MG/DL (ref 8–23)
CALCIUM SERPL-MCNC: 9.3 MG/DL (ref 8.8–10.2)
CHLORIDE SERPL-SCNC: 103 MMOL/L (ref 98–111)
CO2 SERPL-SCNC: 27 MMOL/L (ref 22–29)
CREAT SERPL-MCNC: 0.8 MG/DL (ref 0.5–0.9)
EOSINOPHIL # BLD: 0 K/UL (ref 0–0.6)
EOSINOPHIL NFR BLD: 0.2 % (ref 0–5)
ERYTHROCYTE [DISTWIDTH] IN BLOOD BY AUTOMATED COUNT: 14.8 % (ref 11.5–14.5)
GLUCOSE SERPL-MCNC: 101 MG/DL (ref 70–99)
HCT VFR BLD AUTO: 44.8 % (ref 37–47)
HGB BLD-MCNC: 14.7 G/DL (ref 12–16)
IMM GRANULOCYTES # BLD: 0.1 K/UL
LYMPHOCYTES # BLD: 2.9 K/UL (ref 1.1–4.5)
LYMPHOCYTES NFR BLD: 22.3 % (ref 20–40)
MCH RBC QN AUTO: 31.1 PG (ref 27–31)
MCHC RBC AUTO-ENTMCNC: 32.8 G/DL (ref 33–37)
MCV RBC AUTO: 94.9 FL (ref 81–99)
MONOCYTES # BLD: 0.9 K/UL (ref 0–0.9)
MONOCYTES NFR BLD: 6.8 % (ref 0–10)
NEUTROPHILS # BLD: 8.9 K/UL (ref 1.5–7.5)
NEUTS SEG NFR BLD: 69.6 % (ref 50–65)
PLATELET # BLD AUTO: 350 K/UL (ref 130–400)
PMV BLD AUTO: 10.2 FL (ref 9.4–12.3)
POTASSIUM SERPL-SCNC: 3.1 MMOL/L (ref 3.5–5)
RBC # BLD AUTO: 4.72 M/UL (ref 4.2–5.4)
SODIUM SERPL-SCNC: 142 MMOL/L (ref 136–145)
WBC # BLD AUTO: 12.9 K/UL (ref 4.8–10.8)

## 2024-10-24 PROCEDURE — 3075F SYST BP GE 130 - 139MM HG: CPT | Performed by: INTERNAL MEDICINE

## 2024-10-24 PROCEDURE — 93000 ELECTROCARDIOGRAM COMPLETE: CPT | Performed by: INTERNAL MEDICINE

## 2024-10-24 PROCEDURE — 1123F ACP DISCUSS/DSCN MKR DOCD: CPT | Performed by: INTERNAL MEDICINE

## 2024-10-24 PROCEDURE — 3078F DIAST BP <80 MM HG: CPT | Performed by: INTERNAL MEDICINE

## 2024-10-24 PROCEDURE — 99214 OFFICE O/P EST MOD 30 MIN: CPT | Performed by: INTERNAL MEDICINE

## 2024-10-24 PROCEDURE — 1159F MED LIST DOCD IN RCRD: CPT | Performed by: INTERNAL MEDICINE

## 2024-10-24 NOTE — PATIENT INSTRUCTIONS
[place] at the [facility] [place] ([facility]) located on the first floor of [facility].   Enter through hospital main entrance and turn immediately to your left.     Procedure Date: 10/28/24  Procedure Arrival Time: 9:30 am   Procedure Start Time: 11:00 am  (Times are subject to change)     Please have your lab work (CBC and BMP) done before your procedure Date.     Procedure Instructions:   1) You will need to not have anything to eat or drink the morning before the procedure.   2) You can still take all of your morning medication with a sip of water    3) You will need a  for the procedure.

## 2024-10-24 NOTE — PROGRESS NOTES
[facility]  [address]  [address]  [facility] [address]  [phone]    Aissatou Snowden is a 69 y.o. female presents with recurrent atrial fibrillation.  She is considering pulmonary vein isolation but developed a respiratory infection which required steroids as well as over-the-counter Sudafed and this exacerbated her atrial fibrillation on Monday night.      Review of Systems   Constitutional: Negative for fever, chills, diaphoresis, activity change, appetite change, fatigue and unexpected weight change.   Eyes: Negative for photophobia, pain, redness and visual disturbance.   Respiratory: Negative for apnea, cough, chest tightness, shortness of breath, wheezing and stridor.    Cardiovascular: Negative for chest pain, palpitations and leg swelling.   Gastrointestinal: Negative for abdominal distention.   Genitourinary: Negative for dysuria, urgency and frequency.   Musculoskeletal: Negative for myalgias, arthralgias and gait problem.   Skin: Negative for color change, pallor, rash and wound.   Neurological: Negative for dizziness, tremors, speech difficulty, weakness and numbness.   Hematological: Does not bruise/bleed easily.   Psychiatric/Behavioral: Negative.          Social History     Socioeconomic History    Marital status:      Spouse name: Not on file    Number of children: Not on file    Years of education: Not on file    Highest education level: Not on file   Occupational History    Not on file   Tobacco Use    Smoking status: Former     Current packs/day: 0.00     Average packs/day: 1 pack/day for 30.6 years (30.6 ttl pk-yrs)     Types: Cigarettes     Start date: 1991     Quit date: 7/26/2021     Years since quitting: 3.2    Smokeless tobacco: Never    Tobacco comments:     Apt in scheduling has documented that patient quit smoking 7/26.21 and she smoked for 30 years   Substance and Sexual Activity    Alcohol use: No    Drug use: No    Sexual activity: Not on file   Other Topics Concern

## 2024-10-25 RX ORDER — SODIUM CHLORIDE 0.9 % (FLUSH) 0.9 %
5-40 SYRINGE (ML) INJECTION EVERY 12 HOURS SCHEDULED
OUTPATIENT
Start: 2024-10-28

## 2024-10-25 RX ORDER — SODIUM CHLORIDE 9 MG/ML
INJECTION, SOLUTION INTRAVENOUS CONTINUOUS
OUTPATIENT
Start: 2024-10-28

## 2024-10-25 RX ORDER — SODIUM CHLORIDE 0.9 % (FLUSH) 0.9 %
5-40 SYRINGE (ML) INJECTION PRN
OUTPATIENT
Start: 2024-10-28

## 2024-10-25 RX ORDER — SODIUM CHLORIDE 9 MG/ML
INJECTION, SOLUTION INTRAVENOUS PRN
OUTPATIENT
Start: 2024-10-28

## 2024-10-28 ENCOUNTER — HOSPITAL ENCOUNTER (OUTPATIENT)
Age: 69
Discharge: HOME | End: 2024-10-28
Attending: INTERNAL MEDICINE

## 2024-11-12 ENCOUNTER — OFFICE VISIT (OUTPATIENT)
Dept: CARDIOLOGY CLINIC | Age: 69
End: 2024-11-12

## 2024-11-12 VITALS
HEART RATE: 64 BPM | DIASTOLIC BLOOD PRESSURE: 60 MMHG | HEIGHT: 65 IN | WEIGHT: 163 LBS | BODY MASS INDEX: 27.16 KG/M2 | SYSTOLIC BLOOD PRESSURE: 116 MMHG

## 2024-11-12 DIAGNOSIS — I48.0 PAROXYSMAL ATRIAL FIBRILLATION (HCC): Primary | ICD-10-CM

## 2024-11-12 DIAGNOSIS — Z01.818 PRE-OP TESTING: ICD-10-CM

## 2024-11-12 RX ORDER — BISOPROLOL FUMARATE 5 MG/1
5 TABLET, FILM COATED ORAL DAILY
Qty: 30 TABLET | Refills: 3 | Status: SHIPPED | OUTPATIENT
Start: 2024-11-12

## 2024-11-12 NOTE — PROGRESS NOTES
activity: Not on file   Other Topics Concern    Not on file   Social History Narrative    Not on file     Social Determinants of Health     Financial Resource Strain: Not on file   Food Insecurity: Not on file   Transportation Needs: Not on file   Physical Activity: Not on file   Stress: Not on file   Social Connections: Unknown (10/11/2023)    Received from [facility]    Family and Community Support     Help with Day-to-Day Activities: Not on file     Lonely or Isolated: Not on file   Intimate Partner Violence: Unknown (10/11/2023)    Received from [facility]    Abuse Screen     Unsafe at Home or Work/School: Not on file     Feels Threatened by Someone?: Not on file     Does Anyone Keep You from Contacting Others or Doint Things Outside the Home?: Not on file     Physical Sign of Abuse Present: Not on file   Housing Stability: Unknown (10/11/2023)    Received from [facility]    Housing Stability     Current Living Arrangements: Not on file     Potentially Unsafe Housing Conditions: Not on file       Allergies   Allergen Reactions    Atenolol Shortness Of Breath and Other (See Comments)     fatigue    Coreg [Carvedilol] Shortness Of Breath and Other (See Comments)     Fatigue     Ace Inhibitors      \"I really don't remember\" - [id number]    Amlodipine Swelling    Augmentin [Amoxicillin-Pot Clavulanate] Nausea Only         Current Outpatient Medications:     bisoprolol (ZEBETA) 5 MG tablet, Take 1 tablet by mouth daily, Disp: 30 tablet, Rfl: 3    clopidogrel (PLAVIX) 75 MG tablet, Take 1 tablet by mouth daily, Disp: 90 tablet, Rfl: 3    apixaban (ELIQUIS) 5 MG TABS tablet, Take 1 tablet by mouth 2 times daily, Disp: 180 tablet, Rfl: 3    dilTIAZem (CARDIZEM CD) 180 MG extended release capsule, Take 1 capsule by mouth 2 times daily, Disp: 180 capsule, Rfl: 3    HYDROcodone-acetaminophen (NORCO)

## 2024-11-12 NOTE — PATIENT INSTRUCTIONS
Cardiac CT Instructions:    Appointment: 11/19/24 @ 9 am, Arrival time 8:30 am  Check-in at [facility] - Patient Registration on the first floor  Fasting after midnight  Take all morning medications that day with a sip of water  Please have CBC and BMP drawn same day as Cardiac CT

## 2024-11-19 ENCOUNTER — HOSPITAL ENCOUNTER (OUTPATIENT)
Dept: CT IMAGING | Age: 69
Discharge: HOME | End: 2024-11-19
Attending: INTERNAL MEDICINE
Payer: MEDICARE

## 2024-11-19 ENCOUNTER — TELEPHONE (OUTPATIENT)
Dept: CARDIOLOGY CLINIC | Age: 69
End: 2024-11-19

## 2024-11-19 DIAGNOSIS — Z01.818 PRE-OP TESTING: ICD-10-CM

## 2024-11-19 DIAGNOSIS — I48.0 PAROXYSMAL ATRIAL FIBRILLATION (HCC): ICD-10-CM

## 2024-11-19 DIAGNOSIS — I48.0 PAROXYSMAL ATRIAL FIBRILLATION (HCC): Primary | ICD-10-CM

## 2024-11-19 LAB
ANION GAP SERPL CALCULATED.3IONS-SCNC: 11 MMOL/L (ref 7–19)
BASOPHILS # BLD: 0.1 K/UL (ref 0–0.2)
BASOPHILS NFR BLD: 0.5 % (ref 0–1)
BUN SERPL-MCNC: 8 MG/DL (ref 8–23)
CALCIUM SERPL-MCNC: 9.1 MG/DL (ref 8.8–10.2)
CHLORIDE SERPL-SCNC: 104 MMOL/L (ref 98–111)
CO2 SERPL-SCNC: 27 MMOL/L (ref 22–29)
CREAT SERPL-MCNC: 0.7 MG/DL (ref 0.5–0.9)
EOSINOPHIL # BLD: 0 K/UL (ref 0–0.6)
EOSINOPHIL NFR BLD: 0.3 % (ref 0–5)
ERYTHROCYTE [DISTWIDTH] IN BLOOD BY AUTOMATED COUNT: 14.1 % (ref 11.5–14.5)
GLUCOSE SERPL-MCNC: 81 MG/DL (ref 70–99)
HCT VFR BLD AUTO: 41.2 % (ref 37–47)
HGB BLD-MCNC: 13.6 G/DL (ref 12–16)
IMM GRANULOCYTES # BLD: 0.1 K/UL
LYMPHOCYTES # BLD: 2.5 K/UL (ref 1.1–4.5)
LYMPHOCYTES NFR BLD: 24 % (ref 20–40)
MCH RBC QN AUTO: 32 PG (ref 27–31)
MCHC RBC AUTO-ENTMCNC: 33 G/DL (ref 33–37)
MCV RBC AUTO: 96.9 FL (ref 81–99)
MONOCYTES # BLD: 0.7 K/UL (ref 0–0.9)
MONOCYTES NFR BLD: 6.8 % (ref 0–10)
NEUTROPHILS # BLD: 7.1 K/UL (ref 1.5–7.5)
NEUTS SEG NFR BLD: 67.7 % (ref 50–65)
PLATELET # BLD AUTO: 319 K/UL (ref 130–400)
PMV BLD AUTO: 10.2 FL (ref 9.4–12.3)
POTASSIUM SERPL-SCNC: 3 MMOL/L (ref 3.5–5)
RBC # BLD AUTO: 4.25 M/UL (ref 4.2–5.4)
SODIUM SERPL-SCNC: 142 MMOL/L (ref 136–145)
WBC # BLD AUTO: 10.5 K/UL (ref 4.8–10.8)

## 2024-11-19 PROCEDURE — 75574 CT ANGIO HRT W/3D IMAGE: CPT

## 2024-11-19 PROCEDURE — 6360000004 HC RX CONTRAST MEDICATION: Performed by: INTERNAL MEDICINE

## 2024-11-19 RX ORDER — IOPAMIDOL 755 MG/ML
70 INJECTION, SOLUTION INTRAVASCULAR
Status: COMPLETED | OUTPATIENT
Start: 2024-11-19 | End: 2024-11-19

## 2024-11-19 RX ADMIN — IOPAMIDOL 90 ML: 755 INJECTION, SOLUTION INTRAVENOUS at 08:54

## 2024-11-19 NOTE — TELEPHONE ENCOUNTER
[place] at the [facility] [place] ([facility]) located on the first floor of [facility]. Enter through hospital main entrance and turn immediately to your left.     Procedure Date: 12/04/24  Cath Lab will call with your arrival time Tuesday 12/03/24  (Times are subject to change)     Please have your lab work (CBC and BMP) done before your procedure Date.   Cardiac CT 11/19/24    Procedure Instructions:   1) You will need to not have anything to eat or drink the morning before the procedure.   2) You will stay overnight for observation.  3) You can still take all of your morning medication with a sip of water   4) Do not take Eliquis or Diltiazem the morning of your procedure. Do not take Bisoprolol starting two days before and morning of your procedure.   5) You will need a  for the procedure.         Went over the above instructions with patient and she voiced understanding.

## 2024-11-25 RX ORDER — ACEBUTOLOL HYDROCHLORIDE 200 MG/1
200 CAPSULE ORAL 2 TIMES DAILY
COMMUNITY
End: 2024-11-25 | Stop reason: SDUPTHER

## 2024-11-25 NOTE — TELEPHONE ENCOUNTER
Patient states the Bisprolol 5 mg is not working and she is still swelling. Any other suggestions on what to try?    Patient has tried in the past: Metoprolol, Betaxolol, Atenolol, Carvedilol and Nebivolol

## 2024-11-26 RX ORDER — ACEBUTOLOL HYDROCHLORIDE 200 MG/1
200 CAPSULE ORAL 2 TIMES DAILY
Qty: 60 CAPSULE | Refills: 1 | Status: SHIPPED | OUTPATIENT
Start: 2024-11-26

## 2024-12-04 ENCOUNTER — ANESTHESIA (OUTPATIENT)
Age: 69
End: 2024-12-04
Payer: MEDICARE

## 2024-12-04 ENCOUNTER — HOSPITAL ENCOUNTER (OUTPATIENT)
Age: 69
Setting detail: OUTPATIENT SURGERY
End: 2024-12-04
Attending: INTERNAL MEDICINE | Admitting: INTERNAL MEDICINE
Payer: MEDICARE

## 2024-12-04 ENCOUNTER — ANESTHESIA EVENT (OUTPATIENT)
Age: 69
End: 2024-12-04
Payer: MEDICARE

## 2024-12-04 VITALS
OXYGEN SATURATION: 98 % | SYSTOLIC BLOOD PRESSURE: 165 MMHG | HEART RATE: 76 BPM | WEIGHT: 163 LBS | RESPIRATION RATE: 15 BRPM | BODY MASS INDEX: 27.16 KG/M2 | TEMPERATURE: 96.8 F | DIASTOLIC BLOOD PRESSURE: 88 MMHG | HEIGHT: 65 IN

## 2024-12-04 DIAGNOSIS — I48.0 PAROXYSMAL ATRIAL FIBRILLATION (HCC): ICD-10-CM

## 2024-12-04 LAB
ANION GAP SERPL CALCULATED.3IONS-SCNC: 12 MMOL/L (ref 7–19)
BUN SERPL-MCNC: 14 MG/DL (ref 8–23)
CALCIUM SERPL-MCNC: 9.5 MG/DL (ref 8.8–10.2)
CHLORIDE SERPL-SCNC: 101 MMOL/L (ref 98–111)
CO2 SERPL-SCNC: 27 MMOL/L (ref 22–29)
CREAT SERPL-MCNC: 0.7 MG/DL (ref 0.5–0.9)
ECHO BSA: 1.84 M2
GLUCOSE SERPL-MCNC: 96 MG/DL (ref 70–99)
POTASSIUM SERPL-SCNC: 3 MMOL/L (ref 3.5–5)
SODIUM SERPL-SCNC: 140 MMOL/L (ref 136–145)

## 2024-12-04 PROCEDURE — C1732 CATH, EP, DIAG/ABL, 3D/VECT: HCPCS | Performed by: INTERNAL MEDICINE

## 2024-12-04 PROCEDURE — 36415 COLL VENOUS BLD VENIPUNCTURE: CPT

## 2024-12-04 PROCEDURE — 6360000002 HC RX W HCPCS: Performed by: NURSE ANESTHETIST, CERTIFIED REGISTERED

## 2024-12-04 PROCEDURE — C1733 CATH, EP, OTHR THAN COOL-TIP: HCPCS | Performed by: INTERNAL MEDICINE

## 2024-12-04 PROCEDURE — 2500000003 HC RX 250 WO HCPCS: Performed by: NURSE ANESTHETIST, CERTIFIED REGISTERED

## 2024-12-04 PROCEDURE — C1769 GUIDE WIRE: HCPCS | Performed by: INTERNAL MEDICINE

## 2024-12-04 PROCEDURE — 6360000002 HC RX W HCPCS: Performed by: INTERNAL MEDICINE

## 2024-12-04 PROCEDURE — C1759 CATH, INTRA ECHOCARDIOGRAPHY: HCPCS | Performed by: INTERNAL MEDICINE

## 2024-12-04 PROCEDURE — C1894 INTRO/SHEATH, NON-LASER: HCPCS | Performed by: INTERNAL MEDICINE

## 2024-12-04 PROCEDURE — 2709999900 HC NON-CHARGEABLE SUPPLY: Performed by: INTERNAL MEDICINE

## 2024-12-04 PROCEDURE — 3700000000 HC ANESTHESIA ATTENDED CARE: Performed by: INTERNAL MEDICINE

## 2024-12-04 PROCEDURE — 80048 BASIC METABOLIC PNL TOTAL CA: CPT

## 2024-12-04 PROCEDURE — 2580000003 HC RX 258: Performed by: NURSE ANESTHETIST, CERTIFIED REGISTERED

## 2024-12-04 PROCEDURE — 3700000001 HC ADD 15 MINUTES (ANESTHESIA): Performed by: INTERNAL MEDICINE

## 2024-12-04 PROCEDURE — C1729 CATH, DRAINAGE: HCPCS | Performed by: INTERNAL MEDICINE

## 2024-12-04 PROCEDURE — C1766 INTRO/SHEATH,STRBLE,NON-PEEL: HCPCS | Performed by: INTERNAL MEDICINE

## 2024-12-04 RX ORDER — SODIUM CHLORIDE 9 MG/ML
INJECTION, SOLUTION INTRAVENOUS CONTINUOUS
Status: DISCONTINUED | OUTPATIENT
Start: 2024-12-04 | End: 2024-12-04 | Stop reason: HOSPADM

## 2024-12-04 RX ORDER — ROCURONIUM BROMIDE 10 MG/ML
INJECTION, SOLUTION INTRAVENOUS
Status: DISCONTINUED | OUTPATIENT
Start: 2024-12-04 | End: 2024-12-04 | Stop reason: SDUPTHER

## 2024-12-04 RX ORDER — VASOPRESSIN 20 [USP'U]/ML
INJECTION, SOLUTION INTRAMUSCULAR; SUBCUTANEOUS
Status: DISCONTINUED | OUTPATIENT
Start: 2024-12-04 | End: 2024-12-04 | Stop reason: SDUPTHER

## 2024-12-04 RX ORDER — SODIUM CHLORIDE 9 MG/ML
INJECTION, SOLUTION INTRAVENOUS PRN
Status: DISCONTINUED | OUTPATIENT
Start: 2024-12-04 | End: 2024-12-04 | Stop reason: HOSPADM

## 2024-12-04 RX ORDER — EPINEPHRINE 1 MG/ML
INJECTION, SOLUTION, CONCENTRATE INTRAVENOUS
Status: DISCONTINUED | OUTPATIENT
Start: 2024-12-04 | End: 2024-12-04 | Stop reason: SDUPTHER

## 2024-12-04 RX ORDER — SODIUM CHLORIDE 0.9 % (FLUSH) 0.9 %
5-40 SYRINGE (ML) INJECTION EVERY 12 HOURS SCHEDULED
Status: DISCONTINUED | OUTPATIENT
Start: 2024-12-04 | End: 2024-12-04 | Stop reason: HOSPADM

## 2024-12-04 RX ORDER — PROPOFOL 10 MG/ML
INJECTION, EMULSION INTRAVENOUS
Status: DISCONTINUED | OUTPATIENT
Start: 2024-12-04 | End: 2024-12-04 | Stop reason: SDUPTHER

## 2024-12-04 RX ORDER — EPHEDRINE SULFATE/0.9% NACL/PF 25 MG/5 ML
SYRINGE (ML) INTRAVENOUS
Status: DISCONTINUED | OUTPATIENT
Start: 2024-12-04 | End: 2024-12-04 | Stop reason: SDUPTHER

## 2024-12-04 RX ORDER — CALCIUM CHLORIDE 100 MG/ML
INJECTION INTRAVENOUS; INTRAVENTRICULAR
Status: DISCONTINUED | OUTPATIENT
Start: 2024-12-04 | End: 2024-12-04 | Stop reason: SDUPTHER

## 2024-12-04 RX ORDER — ONDANSETRON 2 MG/ML
INJECTION INTRAMUSCULAR; INTRAVENOUS
Status: DISCONTINUED | OUTPATIENT
Start: 2024-12-04 | End: 2024-12-04 | Stop reason: SDUPTHER

## 2024-12-04 RX ORDER — FENTANYL CITRATE 50 UG/ML
INJECTION, SOLUTION INTRAMUSCULAR; INTRAVENOUS
Status: DISCONTINUED | OUTPATIENT
Start: 2024-12-04 | End: 2024-12-04 | Stop reason: SDUPTHER

## 2024-12-04 RX ORDER — SODIUM CHLORIDE 0.9 % (FLUSH) 0.9 %
5-40 SYRINGE (ML) INJECTION PRN
Status: DISCONTINUED | OUTPATIENT
Start: 2024-12-04 | End: 2024-12-04 | Stop reason: HOSPADM

## 2024-12-04 RX ORDER — LIDOCAINE HYDROCHLORIDE 10 MG/ML
INJECTION, SOLUTION EPIDURAL; INFILTRATION; INTRACAUDAL; PERINEURAL
Status: DISCONTINUED | OUTPATIENT
Start: 2024-12-04 | End: 2024-12-04 | Stop reason: SDUPTHER

## 2024-12-04 RX ORDER — SODIUM CHLORIDE 9 MG/ML
INJECTION, SOLUTION INTRAVENOUS
Status: DISCONTINUED | OUTPATIENT
Start: 2024-12-04 | End: 2024-12-04 | Stop reason: SDUPTHER

## 2024-12-04 RX ORDER — HEPARIN SODIUM 1000 [USP'U]/ML
INJECTION, SOLUTION INTRAVENOUS; SUBCUTANEOUS PRN
Status: DISCONTINUED | OUTPATIENT
Start: 2024-12-04 | End: 2024-12-04 | Stop reason: HOSPADM

## 2024-12-04 RX ORDER — DEXAMETHASONE SODIUM PHOSPHATE 10 MG/ML
INJECTION, SOLUTION INTRAMUSCULAR; INTRAVENOUS
Status: DISCONTINUED | OUTPATIENT
Start: 2024-12-04 | End: 2024-12-04 | Stop reason: SDUPTHER

## 2024-12-04 RX ORDER — HEPARIN SODIUM 10000 [USP'U]/100ML
INJECTION, SOLUTION INTRAVENOUS CONTINUOUS PRN
Status: DISCONTINUED | OUTPATIENT
Start: 2024-12-04 | End: 2024-12-04 | Stop reason: HOSPADM

## 2024-12-04 RX ADMIN — EPINEPHRINE 1 MG: 1 INJECTION, SOLUTION, CONCENTRATE INTRAVENOUS at 09:46

## 2024-12-04 RX ADMIN — EPINEPHRINE 1 MG: 1 INJECTION, SOLUTION, CONCENTRATE INTRAVENOUS at 09:57

## 2024-12-04 RX ADMIN — CALCIUM CHLORIDE 1 G: 100 INJECTION, SOLUTION INTRAVENOUS at 09:46

## 2024-12-04 RX ADMIN — EPINEPHRINE 1 MG: 1 INJECTION, SOLUTION, CONCENTRATE INTRAVENOUS at 10:00

## 2024-12-04 RX ADMIN — EPINEPHRINE 1 MG: 1 INJECTION, SOLUTION, CONCENTRATE INTRAVENOUS at 10:11

## 2024-12-04 RX ADMIN — PROPOFOL 120 MG: 10 INJECTION, EMULSION INTRAVENOUS at 08:57

## 2024-12-04 RX ADMIN — EPINEPHRINE 1 MG: 1 INJECTION, SOLUTION, CONCENTRATE INTRAVENOUS at 09:41

## 2024-12-04 RX ADMIN — PROPOFOL 80 MG: 10 INJECTION, EMULSION INTRAVENOUS at 09:03

## 2024-12-04 RX ADMIN — PHENYLEPHRINE HYDROCHLORIDE 100 MCG: 10 INJECTION INTRAVENOUS at 09:30

## 2024-12-04 RX ADMIN — LIDOCAINE HYDROCHLORIDE 50 MG: 10 INJECTION, SOLUTION EPIDURAL; INFILTRATION; INTRACAUDAL; PERINEURAL at 08:57

## 2024-12-04 RX ADMIN — PHENYLEPHRINE HYDROCHLORIDE 300 MCG: 10 INJECTION INTRAVENOUS at 09:32

## 2024-12-04 RX ADMIN — ONDANSETRON 4 MG: 2 INJECTION INTRAMUSCULAR; INTRAVENOUS at 09:12

## 2024-12-04 RX ADMIN — EPHEDRINE SULFATE 10 MG: 5 INJECTION INTRAVENOUS at 09:32

## 2024-12-04 RX ADMIN — EPINEPHRINE 1 MG: 1 INJECTION, SOLUTION, CONCENTRATE INTRAVENOUS at 09:54

## 2024-12-04 RX ADMIN — ROCURONIUM BROMIDE 70 MG: 10 INJECTION, SOLUTION INTRAVENOUS at 08:58

## 2024-12-04 RX ADMIN — EPINEPHRINE 1 MG: 1 INJECTION, SOLUTION, CONCENTRATE INTRAVENOUS at 09:50

## 2024-12-04 RX ADMIN — EPINEPHRINE 1 MG: 1 INJECTION, SOLUTION, CONCENTRATE INTRAVENOUS at 09:44

## 2024-12-04 RX ADMIN — EPINEPHRINE 1 MG: 1 INJECTION, SOLUTION, CONCENTRATE INTRAVENOUS at 10:04

## 2024-12-04 RX ADMIN — VASOPRESSIN 5 UNITS: 20 INJECTION INTRAVENOUS at 10:11

## 2024-12-04 RX ADMIN — VASOPRESSIN 5 UNITS: 20 INJECTION INTRAVENOUS at 09:57

## 2024-12-04 RX ADMIN — EPINEPHRINE 1 MG: 1 INJECTION, SOLUTION, CONCENTRATE INTRAVENOUS at 09:37

## 2024-12-04 RX ADMIN — VASOPRESSIN 5 UNITS: 20 INJECTION INTRAVENOUS at 10:04

## 2024-12-04 RX ADMIN — FENTANYL CITRATE 50 MCG: 50 INJECTION, SOLUTION INTRAMUSCULAR; INTRAVENOUS at 08:55

## 2024-12-04 RX ADMIN — EPHEDRINE SULFATE 5 MG: 5 INJECTION INTRAVENOUS at 09:30

## 2024-12-04 RX ADMIN — EPINEPHRINE 1 MG: 1 INJECTION, SOLUTION, CONCENTRATE INTRAVENOUS at 10:15

## 2024-12-04 RX ADMIN — PROPOFOL 100 MCG/KG/MIN: 10 INJECTION, EMULSION INTRAVENOUS at 09:02

## 2024-12-04 RX ADMIN — SODIUM CHLORIDE: 9 INJECTION, SOLUTION INTRAVENOUS at 08:54

## 2024-12-04 RX ADMIN — VASOPRESSIN 5 UNITS: 20 INJECTION INTRAVENOUS at 09:47

## 2024-12-04 RX ADMIN — DEXAMETHASONE SODIUM PHOSPHATE 10 MG: 10 INJECTION, SOLUTION INTRAMUSCULAR; INTRAVENOUS at 09:12

## 2024-12-04 ASSESSMENT — ENCOUNTER SYMPTOMS: SHORTNESS OF BREATH: 1

## 2024-12-04 NOTE — PROGRESS NOTES
24 1245   Encounter Summary   Encounter Overview/Reason Crisis;Spiritual/Emotional Needs   Encounter Code  Assessment by  services   Service Provided For Family   Referral/Consult From Nursing Supervisor/Manager   Support System Spouse;Family members   Complexity of Encounter High   Begin Time 0945   End Time  1055   Total Time Calculated 70 min   Crisis   Type Code Blue   Spiritual/Emotional needs   Type Spiritual Support;Spiritual Distress;Emotional Distress;Difficult news received   Grief, Loss, and Adjustments   Type Anticipatory Grief;Grief and loss;Death;Bereavement Care   Assessment/Intervention/Outcome   Assessment Complicated grieving;Compromised coping;Tearful;Questioning leandro   Intervention Active listening;Prayer (assurance of)/[place];Sustaining Presence/Ministry of presence;Grief Care   Outcome Acceptance;Engaged in conversation;Expressed Gratitude;Receptive;Venting emotion   Plan and Referrals   Plan/Referrals Other (Comment)  (Pt is .)   Does the patient have a [facility] PCP? No         This  responded to a code blue in the cath lab. Pt was coding and staff was performing CPR and resuscitative measures. Pt's family was in the cath holding room. This  stayed with the family as they received updates and eventually were asked to go back close to the cath lab for the dr to speak with them. Pt's never recovered a pulse. Pt's  and family member were devastated. This  was with the family provided support, sustaining presence, grief care and prayer. Pt's  wanted time with his wife and family and this  left and informed staff to notify spiritual care if they needed a  to return. Pt's family expressed gratitude for spiritual care.     Spiritual Health History and Assessment/Progress Note  [facility]    Crisis, Spiritual/Emotional Needs, Code Blue, Anticipatory Grief, Grief and loss, Death, Bereavement Care,

## 2024-12-04 NOTE — PROGRESS NOTES
Patient discharged with [facility]. Patient's gold ring and necklace given to patient's sister.  Electronically signed by Rosy Edge RN on 2024 at 3:01 PM

## 2024-12-04 NOTE — ANESTHESIA POSTPROCEDURE EVALUATION
Department of Anesthesiology  Postprocedure Note    Patient: Aissatou Snowden  MRN: [id number]  YOB: 1955  Date of evaluation: 12/4/2024    Procedure Summary       Date: 12/04/24 Room / Location: [facility] EP LAB 2 / [facility] CARDIAC CATH LAB    Anesthesia Start: 0854 Anesthesia Stop: 1020    Procedure: Ablation A-fib w complete ep study Diagnosis:       Paroxysmal atrial fibrillation (HCC)      (Paroxysmal atrial fibrillation (HCC) [I48.0])    Providers: Raul Frazier MD Responsible Provider: Emily Carmichael APRN - CRNA    Anesthesia Type: general, TIVA ASA Status: 3            Anesthesia Type: No value filed.    Hum[place]a Phase I:      Hum[place]a Phase II:      Anesthesia Post Evaluation    Comments: See pt chart    Encounter Notable Events   Notable Event Outcome Phase Comment   Death  Intraprocedure unclear cause of pulseless electrical activity

## 2024-12-04 NOTE — H&P
[facility] [place], Cardiology  [phone]   Addendum 12/4: Pt seen and examined. Agree with plan.  [id number]

## 2024-12-04 NOTE — ANESTHESIA PRE PROCEDURE
CARDIAC CATH LAB PROCEDURE     • HAND TENDON SURGERY      right   • HYSTERECTOMY (CERVIX STATUS UNKNOWN)         Social History:    Social History     Tobacco Use   • Smoking status: Former     Current packs/day: 0.00     Average packs/day: 1 pack/day for 30.6 years (30.6 ttl pk-yrs)     Types: Cigarettes     Start date: 1991     Quit date: 7/26/2021     Years since quitting: 3.3   • Smokeless tobacco: Never   • Tobacco comments:     Apt in scheduling has documented that patient quit smoking 7/26.21 and she smoked for 30 years   Substance Use Topics   • Alcohol use: No                                Counseling given: Not Answered  Tobacco comments: Apt in scheduling has documented that patient quit smoking 7/26.21 and she smoked for 30 years      Vital Signs (Current):   Vitals:    12/04/24 0707   BP: (!) 165/88   Pulse: 76   Resp: 15   Temp: 96.8 °F (36 °C)   TempSrc: Temporal   SpO2: 98%   Weight: 73.9 kg (163 lb)   Height: 1.651 m (5' 5\")                                              BP Readings from Last 3 Encounters:   12/04/24 (!) 165/88   11/12/24 116/60   10/24/24 136/72       NPO Status: Time of last liquid consumption: 0445                        Time of last solid consumption: 2200                        Date of last liquid consumption: 12/04/24                        Date of last solid food consumption: 12/03/24    BMI:   Wt Readings from Last 3 Encounters:   12/04/24 73.9 kg (163 lb)   11/12/24 73.9 kg (163 lb)   10/24/24 73 kg (161 lb)     Body mass index is 27.12 kg/m².    CBC:   Lab Results   Component Value Date/Time    WBC 10.5 11/19/2024 09:00 AM    RBC 4.25 11/19/2024 09:00 AM    HGB 13.6 11/19/2024 09:00 AM    HCT 41.2 11/19/2024 09:00 AM    HCT 44.6 01/13/2012 09:33 AM    MCV 96.9 11/19/2024 09:00 AM    RDW 14.1 11/19/2024 09:00 AM     11/19/2024 09:00 AM     01/13/2012 09:33 AM       CMP:   Lab Results   Component Value Date/Time     11/19/2024 09:00 AM     01/13/2012

## (undated) DEVICE — CATH BLLN ANGIO 2X12MM SC EUPHORA RX

## (undated) DEVICE — CATH BLLN ANGIO 2.50X8MM NC EUPHORIA RX

## (undated) DEVICE — SURGICAL PROCEDURE PACK CRD CATH LOURDES HOSP LF

## (undated) DEVICE — CATHETER MAP L105CM 2.5-5-2.5MM SPC DECAPOLAR 270DEG STD

## (undated) DEVICE — UMBILICAL CABLE: Brand: UMBILICAL CABLE

## (undated) DEVICE — GLIDESHEATH SS KIT HYDROPHILIC COATED INTRODUCER SHEATH: Brand: GLIDESHEATH

## (undated) DEVICE — PERICARDIOCENTESIS KIT 8.3 FR HI FL STR PROC SPEC DESIGN

## (undated) DEVICE — FIXED CORE WIRE GUIDE SAFE-T-J, CURVED: Brand: COOK

## (undated) DEVICE — GUIDEWIRE VASC L260CM OD.038IN 3CM TIP 7MM TIP OD STD EXCHG

## (undated) DEVICE — STEERABLE SHEATH CLEAR: Brand: FARADRIVE™

## (undated) DEVICE — BAND COMPR L24CM REG CLR PLAS HEMSTAT EXT HK AND LOOP RETEN

## (undated) DEVICE — PERCUTANEOUS ENTRY THINWALL NEEDLE  ONE-PART: Brand: COOK

## (undated) DEVICE — PINNACLE INTRODUCER SHEATH: Brand: PINNACLE

## (undated) DEVICE — TUBING CNTRST DEL NO SYR HI PRSS INJ FEM LUER LCK ROT ADPT

## (undated) DEVICE — CATHETER ULTRASOUND NAVIGATIONAL 10 FRX90 CM VIVID I ACUNAV

## (undated) DEVICE — INFLATION DEVICE KIT: Brand: ENCORE™ 26 ADVANTAGE KIT

## (undated) DEVICE — Device: Brand: NOMOLINE™ LH ADULT NASAL CO2 CANNULA WITH O2 4M

## (undated) DEVICE — ELECTRODE NON SPLIT DISPERSIVE PAD MAESTRO

## (undated) DEVICE — RADIFOCUS OPTITORQUE ANGIOGRAPHIC CATHETER: Brand: OPTITORQUE

## (undated) DEVICE — CATHETER GUID 6FR 0.071IN COR AMPLATZ L 0.75 MID

## (undated) DEVICE — CATHETER CONNECTION CABLE: Brand: FARASTAR™

## (undated) DEVICE — Device

## (undated) DEVICE — GUIDEWIRE VASC J 3 MM 0.035 INX260 CM 10 CM PTFE SS STRT

## (undated) DEVICE — KIT ANGIO W/ AT P65 PREM HND CTRL FOR CNTRST DEL ANGIOTOUCH

## (undated) DEVICE — KIT MFLD ISOLATN NACL CNTRST PRT TBNG SPIK W/ PRSS TRNSDUC

## (undated) DEVICE — TORQUE DEVICE: Brand: TORQUE DEVICE

## (undated) DEVICE — SYSTEM GWIRE L260CM DIA0035IN STD STEER HYDROPHOBIC STR TIP

## (undated) DEVICE — GUIDEWIRE VASC L180CM 0035IN 15MM J ROSEN TIP PTFE FIX COR

## (undated) DEVICE — HI-TORQUE BALANCE MIDDLEWEIGHT UNIVERSAL GUIDE WIRE .014 STRAIGHT TIP 3.0 CM X 190 CM: Brand: HI-TORQUE BALANCE MIDDLEWEIGHT UNIVERSAL

## (undated) DEVICE — VALVE HEMSTAT 8FR INNR LUMN CRSS SLT SEAL DSGN WATCHDOG

## (undated) DEVICE — CATHETER DIAG 5FR L110CM LUMN ID0.047IN PGTL 6 SIDE H HUB

## (undated) DEVICE — 1 X VERSACROSS CONNECT TRANSSEPTAL DILATOR;  1 X VERSACROSS RF WIRE (INCLUDING 1 X CONNECTOR CABLE (SINGLE USE)): Brand: VERSACROSS CONNECT ACCESS SOLUTION FOR FARADRIVE